# Patient Record
Sex: MALE | Race: WHITE | NOT HISPANIC OR LATINO
[De-identification: names, ages, dates, MRNs, and addresses within clinical notes are randomized per-mention and may not be internally consistent; named-entity substitution may affect disease eponyms.]

---

## 2017-01-26 ENCOUNTER — APPOINTMENT (OUTPATIENT)
Dept: INTERNAL MEDICINE | Facility: CLINIC | Age: 82
End: 2017-01-26

## 2017-01-26 VITALS
DIASTOLIC BLOOD PRESSURE: 60 MMHG | SYSTOLIC BLOOD PRESSURE: 120 MMHG | BODY MASS INDEX: 26.33 KG/M2 | OXYGEN SATURATION: 97 % | WEIGHT: 158 LBS | TEMPERATURE: 97.2 F | HEIGHT: 65 IN | HEART RATE: 86 BPM

## 2017-01-30 ENCOUNTER — APPOINTMENT (OUTPATIENT)
Dept: SURGERY | Facility: CLINIC | Age: 82
End: 2017-01-30

## 2017-01-30 VITALS
DIASTOLIC BLOOD PRESSURE: 69 MMHG | HEART RATE: 80 BPM | SYSTOLIC BLOOD PRESSURE: 131 MMHG | HEIGHT: 63 IN | WEIGHT: 151 LBS | TEMPERATURE: 97.4 F | BODY MASS INDEX: 26.75 KG/M2 | OXYGEN SATURATION: 98 %

## 2017-01-30 DIAGNOSIS — I20.9 ANGINA PECTORIS, UNSPECIFIED: ICD-10-CM

## 2017-01-30 DIAGNOSIS — Z86.39 PERSONAL HISTORY OF OTHER ENDOCRINE, NUTRITIONAL AND METABOLIC DISEASE: ICD-10-CM

## 2017-01-30 DIAGNOSIS — R10.30 LOWER ABDOMINAL PAIN, UNSPECIFIED: ICD-10-CM

## 2017-01-30 DIAGNOSIS — Z81.1 FAMILY HISTORY OF ALCOHOL ABUSE AND DEPENDENCE: ICD-10-CM

## 2017-01-30 DIAGNOSIS — E11.9 TYPE 2 DIABETES MELLITUS W/OUT COMPLICATIONS: ICD-10-CM

## 2017-01-30 DIAGNOSIS — E03.9 HYPOTHYROIDISM, UNSPECIFIED: ICD-10-CM

## 2017-01-30 DIAGNOSIS — D64.9 ANEMIA, UNSPECIFIED: ICD-10-CM

## 2017-01-30 DIAGNOSIS — Z87.898 PERSONAL HISTORY OF OTHER SPECIFIED CONDITIONS: ICD-10-CM

## 2017-01-30 DIAGNOSIS — R35.1 BENIGN PROSTATIC HYPERPLASIA WITH LOWER URINARY TRACT SYMPMS: ICD-10-CM

## 2017-01-30 DIAGNOSIS — N40.1 BENIGN PROSTATIC HYPERPLASIA WITH LOWER URINARY TRACT SYMPMS: ICD-10-CM

## 2017-01-30 DIAGNOSIS — Z78.9 OTHER SPECIFIED HEALTH STATUS: ICD-10-CM

## 2017-01-30 DIAGNOSIS — M10.9 GOUT, UNSPECIFIED: ICD-10-CM

## 2017-01-30 DIAGNOSIS — N39.41 URGE INCONTINENCE: ICD-10-CM

## 2017-01-30 DIAGNOSIS — K21.9 GASTRO-ESOPHAGEAL REFLUX DISEASE W/OUT ESOPHAGITIS: ICD-10-CM

## 2017-01-30 DIAGNOSIS — J30.89 OTHER ALLERGIC RHINITIS: ICD-10-CM

## 2017-01-30 DIAGNOSIS — E78.5 HYPERLIPIDEMIA, UNSPECIFIED: ICD-10-CM

## 2017-01-30 DIAGNOSIS — Z87.39 PERSONAL HISTORY OF OTHER DISEASES OF THE MUSCULOSKELETAL SYSTEM AND CONNECTIVE TISSUE: ICD-10-CM

## 2017-01-30 DIAGNOSIS — M79.89 OTHER SPECIFIED SOFT TISSUE DISORDERS: ICD-10-CM

## 2017-02-01 PROBLEM — Z81.1 FAMILY HISTORY OF ALCOHOLISM: Status: ACTIVE | Noted: 2017-02-01

## 2017-02-01 PROBLEM — R10.30 SEVERE LEFT GROIN PAIN: Status: ACTIVE | Noted: 2017-01-30

## 2017-02-21 ENCOUNTER — INPATIENT (INPATIENT)
Facility: HOSPITAL | Age: 82
LOS: 2 days | Discharge: NURSING, REHAB OR LONG-TERM CARE | DRG: 194 | End: 2017-02-24
Attending: INTERNAL MEDICINE | Admitting: INTERNAL MEDICINE
Payer: MEDICARE

## 2017-02-21 VITALS
HEART RATE: 95 BPM | DIASTOLIC BLOOD PRESSURE: 75 MMHG | SYSTOLIC BLOOD PRESSURE: 152 MMHG | RESPIRATION RATE: 18 BRPM | TEMPERATURE: 101 F | OXYGEN SATURATION: 98 %

## 2017-02-21 DIAGNOSIS — Z29.9 ENCOUNTER FOR PROPHYLACTIC MEASURES, UNSPECIFIED: ICD-10-CM

## 2017-02-21 DIAGNOSIS — I25.10 ATHEROSCLEROTIC HEART DISEASE OF NATIVE CORONARY ARTERY WITHOUT ANGINA PECTORIS: ICD-10-CM

## 2017-02-21 DIAGNOSIS — E11.9 TYPE 2 DIABETES MELLITUS WITHOUT COMPLICATIONS: ICD-10-CM

## 2017-02-21 DIAGNOSIS — M79.89 OTHER SPECIFIED SOFT TISSUE DISORDERS: ICD-10-CM

## 2017-02-21 DIAGNOSIS — Z98.890 OTHER SPECIFIED POSTPROCEDURAL STATES: Chronic | ICD-10-CM

## 2017-02-21 DIAGNOSIS — N40.0 BENIGN PROSTATIC HYPERPLASIA WITHOUT LOWER URINARY TRACT SYMPTOMS: ICD-10-CM

## 2017-02-21 DIAGNOSIS — R53.1 WEAKNESS: ICD-10-CM

## 2017-02-21 DIAGNOSIS — R10.30 LOWER ABDOMINAL PAIN, UNSPECIFIED: ICD-10-CM

## 2017-02-21 DIAGNOSIS — J10.1 INFLUENZA DUE TO OTHER IDENTIFIED INFLUENZA VIRUS WITH OTHER RESPIRATORY MANIFESTATIONS: ICD-10-CM

## 2017-02-21 DIAGNOSIS — H26.40 UNSPECIFIED SECONDARY CATARACT: Chronic | ICD-10-CM

## 2017-02-21 DIAGNOSIS — R21 RASH AND OTHER NONSPECIFIC SKIN ERUPTION: ICD-10-CM

## 2017-02-21 DIAGNOSIS — R63.8 OTHER SYMPTOMS AND SIGNS CONCERNING FOOD AND FLUID INTAKE: ICD-10-CM

## 2017-02-21 DIAGNOSIS — R10.31 RIGHT LOWER QUADRANT PAIN: ICD-10-CM

## 2017-02-21 DIAGNOSIS — Z95.1 PRESENCE OF AORTOCORONARY BYPASS GRAFT: Chronic | ICD-10-CM

## 2017-02-21 DIAGNOSIS — E03.9 HYPOTHYROIDISM, UNSPECIFIED: ICD-10-CM

## 2017-02-21 LAB
ALBUMIN SERPL ELPH-MCNC: 3.9 G/DL — SIGNIFICANT CHANGE UP (ref 3.4–5)
ALP SERPL-CCNC: 67 U/L — SIGNIFICANT CHANGE UP (ref 40–120)
ALT FLD-CCNC: 20 U/L — SIGNIFICANT CHANGE UP (ref 12–42)
ANION GAP SERPL CALC-SCNC: 8 MMOL/L — LOW (ref 9–16)
APPEARANCE UR: CLEAR — SIGNIFICANT CHANGE UP
AST SERPL-CCNC: 16 U/L — SIGNIFICANT CHANGE UP (ref 15–37)
BACTERIA # UR AUTO: SIGNIFICANT CHANGE UP /HPF
BASOPHILS NFR BLD AUTO: 0.2 % — SIGNIFICANT CHANGE UP (ref 0–2)
BILIRUB SERPL-MCNC: 0.5 MG/DL — SIGNIFICANT CHANGE UP (ref 0.2–1.2)
BILIRUB UR-MCNC: NEGATIVE — SIGNIFICANT CHANGE UP
BUN SERPL-MCNC: 16 MG/DL — SIGNIFICANT CHANGE UP (ref 7–23)
CALCIUM SERPL-MCNC: 8.6 MG/DL — SIGNIFICANT CHANGE UP (ref 8.5–10.5)
CHLORIDE SERPL-SCNC: 103 MMOL/L — SIGNIFICANT CHANGE UP (ref 96–108)
CO2 SERPL-SCNC: 28 MMOL/L — SIGNIFICANT CHANGE UP (ref 22–31)
COLOR SPEC: SIGNIFICANT CHANGE UP
CREAT SERPL-MCNC: 0.88 MG/DL — SIGNIFICANT CHANGE UP (ref 0.5–1.3)
DIFF PNL FLD: (no result)
EOSINOPHIL NFR BLD AUTO: 1 % — SIGNIFICANT CHANGE UP (ref 0–6)
EPI CELLS # UR: SIGNIFICANT CHANGE UP /HPF
EXTRA BLUE TOP TUBE: SIGNIFICANT CHANGE UP
EXTRA SST TUBE: SIGNIFICANT CHANGE UP
FLUAV H1 2009 PAND RNA SPEC QL NAA+PROBE: DETECTED
GLUCOSE SERPL-MCNC: 132 MG/DL — HIGH (ref 70–99)
GLUCOSE UR QL: NEGATIVE — SIGNIFICANT CHANGE UP
HBA1C BLD-MCNC: 6.5 % — HIGH (ref 4.8–5.6)
HCT VFR BLD CALC: 37.3 % — LOW (ref 39–50)
HGB BLD-MCNC: 12.5 G/DL — LOW (ref 13–17)
KETONES UR-MCNC: NEGATIVE — SIGNIFICANT CHANGE UP
LACTATE SERPL-SCNC: 1 MMOL/L — SIGNIFICANT CHANGE UP (ref 0.5–2)
LEUKOCYTE ESTERASE UR-ACNC: NEGATIVE — SIGNIFICANT CHANGE UP
LYMPHOCYTES # BLD AUTO: 10 % — LOW (ref 13–44)
MANUAL DIF COMMENT BLD-IMP: SIGNIFICANT CHANGE UP
MANUAL SMEAR VERIFICATION: SIGNIFICANT CHANGE UP
MCHC RBC-ENTMCNC: 30.3 PG — SIGNIFICANT CHANGE UP (ref 27–34)
MCHC RBC-ENTMCNC: 33.5 G/DL — SIGNIFICANT CHANGE UP (ref 32–36)
MCV RBC AUTO: 90.3 FL — SIGNIFICANT CHANGE UP (ref 80–100)
MONOCYTES NFR BLD AUTO: 12 % — SIGNIFICANT CHANGE UP (ref 2–14)
NEUTROPHILS NFR BLD AUTO: 61 % — SIGNIFICANT CHANGE UP (ref 43–77)
NEUTS BAND # BLD: 16 % — HIGH
NITRITE UR-MCNC: NEGATIVE — SIGNIFICANT CHANGE UP
NT-PROBNP SERPL-SCNC: 1834 PG/ML — HIGH
PH UR: 5.5 — SIGNIFICANT CHANGE UP (ref 4–8)
PLAT MORPH BLD: NORMAL — SIGNIFICANT CHANGE UP
PLATELET # BLD AUTO: 130 K/UL — LOW (ref 150–400)
POTASSIUM SERPL-MCNC: 4 MMOL/L — SIGNIFICANT CHANGE UP (ref 3.5–5.3)
POTASSIUM SERPL-SCNC: 4 MMOL/L — SIGNIFICANT CHANGE UP (ref 3.5–5.3)
PROT SERPL-MCNC: 7.4 G/DL — SIGNIFICANT CHANGE UP (ref 6.4–8.2)
PROT UR-MCNC: NEGATIVE MG/DL — SIGNIFICANT CHANGE UP
RAPID RVP RESULT: DETECTED
RBC # BLD: 4.13 M/UL — LOW (ref 4.2–5.8)
RBC # FLD: 13.7 % — SIGNIFICANT CHANGE UP (ref 10.3–16.9)
RBC BLD AUTO: NORMAL — SIGNIFICANT CHANGE UP
RBC CASTS # UR COMP ASSIST: < 5 /HPF — SIGNIFICANT CHANGE UP
SODIUM SERPL-SCNC: 139 MMOL/L — SIGNIFICANT CHANGE UP (ref 135–145)
SP GR SPEC: 1.01 — SIGNIFICANT CHANGE UP (ref 1–1.03)
TSH SERPL-MCNC: 0.34 UIU/ML — LOW (ref 0.35–4.94)
UROBILINOGEN FLD QL: 0.2 E.U./DL — SIGNIFICANT CHANGE UP
WBC # BLD: 5 K/UL — SIGNIFICANT CHANGE UP (ref 3.8–10.5)
WBC # FLD AUTO: 5 K/UL — SIGNIFICANT CHANGE UP (ref 3.8–10.5)
WBC UR QL: < 5 /HPF — SIGNIFICANT CHANGE UP

## 2017-02-21 PROCEDURE — 99223 1ST HOSP IP/OBS HIGH 75: CPT

## 2017-02-21 PROCEDURE — 71020: CPT | Mod: 26

## 2017-02-21 PROCEDURE — 99285 EMERGENCY DEPT VISIT HI MDM: CPT | Mod: 25

## 2017-02-21 PROCEDURE — 74176 CT ABD & PELVIS W/O CONTRAST: CPT | Mod: 26

## 2017-02-21 RX ORDER — OMEPRAZOLE 10 MG/1
1 CAPSULE, DELAYED RELEASE ORAL
Qty: 0 | Refills: 0 | COMMUNITY

## 2017-02-21 RX ORDER — LINAGLIPTIN 5 MG/1
1 TABLET, FILM COATED ORAL
Qty: 0 | Refills: 0 | COMMUNITY

## 2017-02-21 RX ORDER — ALBUTEROL 90 UG/1
1 AEROSOL, METERED ORAL
Qty: 0 | Refills: 0 | Status: DISCONTINUED | OUTPATIENT
Start: 2017-02-21 | End: 2017-02-24

## 2017-02-21 RX ORDER — SODIUM CHLORIDE 9 MG/ML
1000 INJECTION INTRAMUSCULAR; INTRAVENOUS; SUBCUTANEOUS
Qty: 0 | Refills: 0 | Status: DISCONTINUED | OUTPATIENT
Start: 2017-02-21 | End: 2017-02-21

## 2017-02-21 RX ORDER — INSULIN LISPRO 100/ML
VIAL (ML) SUBCUTANEOUS
Qty: 0 | Refills: 0 | Status: DISCONTINUED | OUTPATIENT
Start: 2017-02-21 | End: 2017-02-24

## 2017-02-21 RX ORDER — HEPARIN SODIUM 5000 [USP'U]/ML
5000 INJECTION INTRAVENOUS; SUBCUTANEOUS EVERY 8 HOURS
Qty: 0 | Refills: 0 | Status: DISCONTINUED | OUTPATIENT
Start: 2017-02-21 | End: 2017-02-24

## 2017-02-21 RX ORDER — ASPIRIN/CALCIUM CARB/MAGNESIUM 324 MG
81 TABLET ORAL DAILY
Qty: 0 | Refills: 0 | Status: DISCONTINUED | OUTPATIENT
Start: 2017-02-21 | End: 2017-02-24

## 2017-02-21 RX ORDER — FUROSEMIDE 40 MG
40 TABLET ORAL DAILY
Qty: 0 | Refills: 0 | Status: DISCONTINUED | OUTPATIENT
Start: 2017-02-21 | End: 2017-02-24

## 2017-02-21 RX ORDER — SODIUM CHLORIDE 9 MG/ML
2000 INJECTION INTRAMUSCULAR; INTRAVENOUS; SUBCUTANEOUS ONCE
Qty: 0 | Refills: 0 | Status: COMPLETED | OUTPATIENT
Start: 2017-02-21 | End: 2017-02-21

## 2017-02-21 RX ORDER — BUDESONIDE, MICRONIZED 100 %
1 POWDER (GRAM) MISCELLANEOUS
Qty: 0 | Refills: 0 | COMMUNITY

## 2017-02-21 RX ORDER — ATORVASTATIN CALCIUM 80 MG/1
10 TABLET, FILM COATED ORAL AT BEDTIME
Qty: 0 | Refills: 0 | Status: DISCONTINUED | OUTPATIENT
Start: 2017-02-21 | End: 2017-02-23

## 2017-02-21 RX ORDER — HYDROCORTISONE 1 %
1 OINTMENT (GRAM) TOPICAL
Qty: 0 | Refills: 0 | COMMUNITY

## 2017-02-21 RX ORDER — ATORVASTATIN CALCIUM 80 MG/1
1 TABLET, FILM COATED ORAL
Qty: 0 | Refills: 0 | COMMUNITY

## 2017-02-21 RX ORDER — ACETAMINOPHEN 500 MG
650 TABLET ORAL EVERY 6 HOURS
Qty: 0 | Refills: 0 | Status: DISCONTINUED | OUTPATIENT
Start: 2017-02-21 | End: 2017-02-24

## 2017-02-21 RX ORDER — ACETAMINOPHEN 500 MG
650 TABLET ORAL ONCE
Qty: 0 | Refills: 0 | Status: COMPLETED | OUTPATIENT
Start: 2017-02-21 | End: 2017-02-21

## 2017-02-21 RX ORDER — PANTOPRAZOLE SODIUM 20 MG/1
40 TABLET, DELAYED RELEASE ORAL
Qty: 0 | Refills: 0 | Status: DISCONTINUED | OUTPATIENT
Start: 2017-02-21 | End: 2017-02-24

## 2017-02-21 RX ADMIN — Medication 650 MILLIGRAM(S): at 12:41

## 2017-02-21 RX ADMIN — SODIUM CHLORIDE 6000 MILLILITER(S): 9 INJECTION INTRAMUSCULAR; INTRAVENOUS; SUBCUTANEOUS at 14:12

## 2017-02-21 RX ADMIN — HEPARIN SODIUM 5000 UNIT(S): 5000 INJECTION INTRAVENOUS; SUBCUTANEOUS at 22:21

## 2017-02-21 RX ADMIN — ATORVASTATIN CALCIUM 10 MILLIGRAM(S): 80 TABLET, FILM COATED ORAL at 22:21

## 2017-02-21 RX ADMIN — Medication 75 MILLIGRAM(S): at 16:09

## 2017-02-21 NOTE — ED PROVIDER NOTE - NEUROLOGICAL, MLM
Alert and oriented x 2, slow to respond to questioning, no focal deficits, no motor or sensory deficits.

## 2017-02-21 NOTE — ED PROVIDER NOTE - PROGRESS NOTE DETAILS
improved with IVF,  looks better.  still elderly and weakened by flu.  given sig comorbidities will bring in for cont ivf, tx of flu and monitoring.

## 2017-02-21 NOTE — ED PROVIDER NOTE - CARE PLAN
Principal Discharge DX:	Influenza A H1N1 infection  Secondary Diagnosis:	Dehydration  Secondary Diagnosis:	Confusion

## 2017-02-21 NOTE — H&P ADULT. - PSH
After cataract, bilateral    H/O bilateral inguinal hernia repair    S/P CABG (coronary artery bypass graft)

## 2017-02-21 NOTE — H&P ADULT. - PMH
BPH (benign prostatic hyperplasia)    CAD (coronary artery disease)    Cataracts, bilateral    DM (diabetes mellitus)    Hernia    Hypothyroid    Uncomplicated asthma, unspecified asthma severity

## 2017-02-21 NOTE — ED ADULT NURSE NOTE - CHIEF COMPLAINT QUOTE
Patient c/o BLE weakness since yesterday. Denies focal deficits or confusion. Poor po intake. Reports loose cough as well as RLQ abdominal pain. Noted to have temp of 100.6 on arrival.

## 2017-02-21 NOTE — ED PROVIDER NOTE - OBJECTIVE STATEMENT
83 yo M with hx of DM, CAD s/p 4xCABG 2005 at [facility], hypothyroidism presenting with 2 days of cough, fever, lethargy, generalized weakness and slight confusion as per wife.  Pt also complains of mild RLQ pain x several months.  No n/v/d/c.  Hx of hernia surgery in past being followed by [facility].

## 2017-02-21 NOTE — ED ADULT NURSE NOTE - OBJECTIVE STATEMENT
Pt to ER , brought in by wife for evaluation of bilateral lower extremity weakness and cough x yesterday. Wife states she noted generalized weakness x yesterday, RLQ pain for 1 month, evaluated by PMD.  Pt awake alert, responsive to all stimuli, breathing unlabored, in no acute distress.

## 2017-02-21 NOTE — H&P ADULT. - PROBLEM SELECTOR PLAN 1
Sudden onset, severe, sharp, well-localized, non-radiating RLQ pain for 1 day with h/o multiple inguinal hernia repairs. No leukocytosis, UA normal, CT with diverticulosis, mild bladder wall thickening which could be due to underdistention versus cystitis, no hydronephrosis, no nephrolithiasis, and normal appendix.   -surgery consult Sudden onset, severe, sharp, well-localized, non-radiating right inguinal pain for 1 day with h/o multiple inguinal hernia repairs. Physical exam c/w TTP in the right inguinal area. No appreciated inguinal hernia on exam.   CT a/p showed diverticulosis, mild bladder wall thickening which could be due to underdistention versus cystitis, no hydronephrosis, no nephrolithiasis, and normal appendix.   Right inguinal pain most likely 2/2 muscle strain vs. ligament strain. Pt wife reports that pain began after lifting heavy object in January and has not subsided. Otherwise, unclear etiology at this time.  Low likelihood of diverticulitis or appendicitis given CT a/p report.  No hernia on exam.   - will continue to monitor and offer tylenol PRN for pain

## 2017-02-21 NOTE — H&P ADULT. - PROBLEM SELECTOR PLAN 3
B/l chronic leg swelling with 3+ edema 3/4 lower leg. A/w warm, erythematous, scaly rash, with hemosiderin deposition. Calf tenderness b/l. Venous stasis vs cellulitis vs DVT.  -lasix 40 mg PO daily  -b/l doppler US  -topical triamcinolone 0.1%  -leg elevation

## 2017-02-21 NOTE — ED PROVIDER NOTE - MEDICAL DECISION MAKING DETAILS
Elderly M with s/s noted above.  + influenza A.  Given weak appearance, sig chronic comorbidities will admit for IVF, flu tx and monitoring.

## 2017-02-21 NOTE — H&P ADULT. - PROBLEM SELECTOR PLAN 2
Pt has 1 day of lethargy and weakness. Febrile to 100.6 in ED, fever resolved with acetaminophen. Found to be influenza positive and started on tamifu.   -c/w tamifu  -c/w IVF NS  -acetaminophen PRN for temp >100.4  -isolation Pt has 1 day of lethargy and weakness. Febrile to 100.6 in ED, fever resolved with acetaminophen. Found to be influenza positive and started on tamifu.   -c/w tamifu for 5 days   -c/w IVF NS  -acetaminophen PRN for temp >100.4  -isolation Pt has 1 day of lethargy and weakness. Febrile to 100.6 in ED, fever resolved with acetaminophen. Found to be influenza positive (AH3) and started on tamifu.   -c/w tamifu for 5 days   -c/w IVF NS  -acetaminophen PRN for temp >100.4  -isolation

## 2017-02-21 NOTE — ED PROVIDER NOTE - PMH
CAD (coronary artery disease)    DM (diabetes mellitus)    Hypothyroid    Uncomplicated asthma, unspecified asthma severity

## 2017-02-21 NOTE — ED PROVIDER NOTE - ENMT, MLM
Airway patent, Nasal mucosa clear. Dry MM.  Throat has no vesicles, no oropharyngeal exudates and uvula is midline.

## 2017-02-21 NOTE — H&P ADULT. - PROBLEM SELECTOR PLAN 4
Pt has h/o hypothyroidism, but was told to stop his levothyroxine recently. Weakness may be 2/2 hypthyroidism vs. influenza +  - f/u TSH

## 2017-02-21 NOTE — H&P ADULT. - ATTENDING COMMENTS
pt seen and examined on 2/21/2017  reviewed h&p, vs, labs, CXR, CT a/p report   pt a/f flu; also w/ R inguinal pain x 1 month in setting of heavy lifting    PE  gen: awake, alert, tired appearing, answers questions w/ few words, weak ; sweating slightly    heent : pinpoint pupils b/l; no JVD, MMM   cvs: LUSB murmur  lungs: CTA anterior and laterally, pt unable to sit up or turn to side for posterior auscultation  abd: slightly distended abdomen, soft ; there is pain on palpation of R inguinal hernia repair region; no scrotal tenderness b/l   legs: b/l lower ext +2 pitting edema w/ erythema, venous stasis changes b/l ; there is lower ext tenderness noted as well b/l   a/p:   1. flu: on tamiflu; s/p IVFs in ED, appeared euvolemic at time of exam ; monitor fluid status, hold off on IVFs for now  2. R inguinal pain: concern for recurring hernia, given lifting episode noted in HPI; consult surgery for further evaluation  3. lower ext edema/ venous stasis: restarted on lasix; dopplers pending; obtain collateral from pt's cardiologisy (Dr. Hendrickson) re: last ECHO  4. BPH: restart finasteride and oxybutinin as tolerated by bp pt seen and examined on 2/21/2017  reviewed h&p, vs, labs, CXR, CT a/p report   pt a/f flu; also w/ R inguinal pain x 1 month in setting of heavy lifting    PE  gen: awake, alert, tired appearing, answers questions w/ few words, weak ; sweating slightly    heent : pinpoint pupils b/l; no JVD, MMM   cvs: LUSB murmur  lungs: CTA anterior and laterally, pt unable to sit up or turn to side for posterior auscultation  abd/gu: slightly distended abdomen, soft ; there is pain on palpation of R inguinal hernia repair region; +wearing [place] catheter; no scrotal tenderness b/l   legs: b/l lower ext +2 pitting edema w/ erythema, venous stasis changes b/l ; there is lower ext tenderness noted as well b/l   a/p:   1. flu: on tamiflu; s/p IVFs in ED, appeared euvolemic at time of exam ; monitor fluid status, hold off on IVFs for now  2. R inguinal pain: concern for recurring hernia, given lifting episode noted in HPI; consult surgery for further evaluation  3. lower ext edema/ venous stasis: restarted on lasix; dopplers pending; obtain collateral from pt's cardiologisy (Dr. Hendrickson) re: last ECHO  4. BPH: restart finasteride and oxybutinin as tolerated by bp pt seen and examined on 2/21/2017  reviewed h&p, vs, labs, CXR, CT a/p report   pt a/f flu; also w/ R inguinal pain x 1 month in setting of heavy lifting    PE  gen: awake, alert, tired appearing, answers questions w/ few words, weak ; sweating slightly    heent : pinpoint pupils b/l; no JVD, MMM   cvs: LUSB murmur  lungs: CTA anterior and laterally, pt unable to sit up or turn to side for posterior auscultation  abd/gu: slightly distended abdomen, soft ; there is pain on palpation of R inguinal hernia repair region; +wearing [place] catheter; no scrotal tenderness b/l   legs: b/l lower ext +2 pitting edema w/ erythema, venous stasis changes b/l ; there is lower ext tenderness noted as well b/l   a/p:   1. flu: on tamiflu; s/p IVFs in ED, appeared euvolemic at time of exam ; monitor fluid status, hold off on IVFs for now  2. R inguinal pain: concern for recurring hernia, given lifting episode noted in HPI; consult surgery for further evaluation  3. lower ext edema/ venous stasis: restarted on lasix; dopplers pending; obtain collateral from pt's cardiologist (Dr. Hendrickson) re: last ECHO  4. BPH: restart finasteride and oxybutinin as tolerated by bp

## 2017-02-21 NOTE — H&P ADULT. - ASSESSMENT
83 yo M with PMH of CAD s/p CABG,  multiple hernias x4, DM, hypothyroidism, cataracts, BPH, chronic leg swelling presents with one day of lethargy and weakness due to severe, sharp RLQ pain. Pt febrile in ED and influenza positive, CT with chronic diverticulosis. 81 yo M with PMH of CAD s/p CABG,  multiple hernias x4, DM, hypothyroidism, cataracts, BPH, chronic leg swelling presents with one day of lethargy and weakness due to severe, sharp RLQ pain. Pt febrile in ED and influenza positive, labs wnl, CT with chronic diverticulosis. 83 yo M with PMH of CAD s/p CABG,  multiple hernias x4, DM, hypothyroidism, cataracts, BPH, chronic leg swelling presents with one day of lethargy and weakness due to severe, sharp right inguinal pain. Pt febrile in ED and influenza positive, labs wnl, CT with chronic diverticulosis.

## 2017-02-21 NOTE — H&P ADULT. - PROBLEM SELECTOR PLAN 7
Pt has long standing history of BPH, was on finasteride and oxybutynin, but were stopped as pt states did not help him.   - gonzalez in place for retention   - will dc tomorrow for [facility] Pt has long standing history of BPH, was on finasteride and oxybutynin, but were stopped as pt states did not help him.

## 2017-02-21 NOTE — H&P ADULT. - HISTORY OF PRESENT ILLNESS
84 yo M with PMH of CAD s/p CABG, DM, hypothyroidism, multiple hernias x4, cataracts and BPH presents with one day of weakness and lethargy. Pt says weakness started last night when he was unable to get up from his chair due to RLQ pain, well localized over the right inguinal ligament. Pain does not radiate and is worse with movement and feels better when he pushes on the area with his hand, not worse with cough. He describes feeling lethargic. Denies having fevers, chills, dysuria, CP, and SOB. Pt also has chronic leg swelling that comes and goes. Was previously given lasix by his cardiologist, but did not notice any improvement in leg swelling so discontinued use.     In ED vitals T100.6, HR 95, /75, RR 18, O2 98% on RA. Found to be influenza positive. Given oseltamivir,  2L NS bolus, 1L NS at 150 ml/hour, acetaminophen tablet  mg. 82 yo M with PMH of CAD s/p CABG, DM, hypothyroidism, multiple hernias x4, cataracts and BPH presents with one day of weakness and lethargy. Pt says weakness started last night when he was unable to get up from his chair due to RLQ pain, well localized over the right inguinal ligament. Pain does not radiate and is worse with movement and feels better when he pushes on the area with his hand, not worse with cough. Per wife, pain started after lifting a heavy object in January and has not improved since then. He describes feeling lethargic. Denies having fevers, chills, dysuria, CP, and SOB. No change in urinary frequency or bowel habits. Pt also has chronic leg swelling that comes and goes. Was previously given lasix by his cardiologist, but did not notice any improvement in leg swelling so discontinued use.     In ED vitals T100.6, HR 95, /75, RR 18, O2 98% on RA. Found to be influenza positive. Given oseltamivir,  2L NS bolus, 1L NS at 150 ml/hour, acetaminophen tablet  mg.

## 2017-02-21 NOTE — H&P ADULT. - GASTROINTESTINAL DETAILS
no rebound tenderness/normal/no distention/no organomegaly/bowel sounds normal/soft/no rigidity/no masses palpable/no guarding

## 2017-02-22 DIAGNOSIS — K59.00 CONSTIPATION, UNSPECIFIED: ICD-10-CM

## 2017-02-22 DIAGNOSIS — J11.1 INFLUENZA DUE TO UNIDENTIFIED INFLUENZA VIRUS WITH OTHER RESPIRATORY MANIFESTATIONS: ICD-10-CM

## 2017-02-22 DIAGNOSIS — E03.9 HYPOTHYROIDISM, UNSPECIFIED: ICD-10-CM

## 2017-02-22 LAB
ANION GAP SERPL CALC-SCNC: 8 MMOL/L — LOW (ref 9–16)
BUN SERPL-MCNC: 16 MG/DL — SIGNIFICANT CHANGE UP (ref 7–23)
CALCIUM SERPL-MCNC: 8.3 MG/DL — LOW (ref 8.5–10.5)
CHLORIDE SERPL-SCNC: 107 MMOL/L — SIGNIFICANT CHANGE UP (ref 96–108)
CO2 SERPL-SCNC: 25 MMOL/L — SIGNIFICANT CHANGE UP (ref 22–31)
CREAT SERPL-MCNC: 0.9 MG/DL — SIGNIFICANT CHANGE UP (ref 0.5–1.3)
CULTURE RESULTS: NO GROWTH — SIGNIFICANT CHANGE UP
GLUCOSE SERPL-MCNC: 120 MG/DL — HIGH (ref 70–99)
HCT VFR BLD CALC: 33.7 % — LOW (ref 39–50)
HGB BLD-MCNC: 11.1 G/DL — LOW (ref 13–17)
MAGNESIUM SERPL-MCNC: 1.9 MG/DL — SIGNIFICANT CHANGE UP (ref 1.6–2.4)
MCHC RBC-ENTMCNC: 30.4 PG — SIGNIFICANT CHANGE UP (ref 27–34)
MCHC RBC-ENTMCNC: 32.9 G/DL — SIGNIFICANT CHANGE UP (ref 32–36)
MCV RBC AUTO: 92.3 FL — SIGNIFICANT CHANGE UP (ref 80–100)
PLATELET # BLD AUTO: 102 K/UL — LOW (ref 150–400)
POTASSIUM SERPL-MCNC: 4 MMOL/L — SIGNIFICANT CHANGE UP (ref 3.5–5.3)
POTASSIUM SERPL-SCNC: 4 MMOL/L — SIGNIFICANT CHANGE UP (ref 3.5–5.3)
RBC # BLD: 3.65 M/UL — LOW (ref 4.2–5.8)
RBC # FLD: 14.1 % — SIGNIFICANT CHANGE UP (ref 10.3–16.9)
SODIUM SERPL-SCNC: 140 MMOL/L — SIGNIFICANT CHANGE UP (ref 135–145)
SPECIMEN SOURCE: SIGNIFICANT CHANGE UP
WBC # BLD: 4.2 K/UL — SIGNIFICANT CHANGE UP (ref 3.8–10.5)
WBC # FLD AUTO: 4.2 K/UL — SIGNIFICANT CHANGE UP (ref 3.8–10.5)

## 2017-02-22 PROCEDURE — 93970 EXTREMITY STUDY: CPT | Mod: 26

## 2017-02-22 PROCEDURE — 99232 SBSQ HOSP IP/OBS MODERATE 35: CPT

## 2017-02-22 RX ORDER — DOCUSATE SODIUM 100 MG
100 CAPSULE ORAL DAILY
Qty: 0 | Refills: 0 | Status: DISCONTINUED | OUTPATIENT
Start: 2017-02-22 | End: 2017-02-24

## 2017-02-22 RX ORDER — SENNA PLUS 8.6 MG/1
1 TABLET ORAL DAILY
Qty: 0 | Refills: 0 | Status: DISCONTINUED | OUTPATIENT
Start: 2017-02-22 | End: 2017-02-24

## 2017-02-22 RX ORDER — POLYETHYLENE GLYCOL 3350 17 G/17G
17 POWDER, FOR SOLUTION ORAL DAILY
Qty: 0 | Refills: 0 | Status: DISCONTINUED | OUTPATIENT
Start: 2017-02-22 | End: 2017-02-24

## 2017-02-22 RX ORDER — ACETAMINOPHEN 500 MG
650 TABLET ORAL EVERY 6 HOURS
Qty: 0 | Refills: 0 | Status: DISCONTINUED | OUTPATIENT
Start: 2017-02-22 | End: 2017-02-24

## 2017-02-22 RX ORDER — TAMSULOSIN HYDROCHLORIDE 0.4 MG/1
0.4 CAPSULE ORAL AT BEDTIME
Qty: 0 | Refills: 0 | Status: DISCONTINUED | OUTPATIENT
Start: 2017-02-22 | End: 2017-02-24

## 2017-02-22 RX ADMIN — Medication 2: at 13:17

## 2017-02-22 RX ADMIN — Medication 40 MILLIGRAM(S): at 06:57

## 2017-02-22 RX ADMIN — HEPARIN SODIUM 5000 UNIT(S): 5000 INJECTION INTRAVENOUS; SUBCUTANEOUS at 06:57

## 2017-02-22 RX ADMIN — HEPARIN SODIUM 5000 UNIT(S): 5000 INJECTION INTRAVENOUS; SUBCUTANEOUS at 13:27

## 2017-02-22 RX ADMIN — Medication 75 MILLIGRAM(S): at 17:25

## 2017-02-22 RX ADMIN — Medication 100 MILLIGRAM(S): at 13:19

## 2017-02-22 RX ADMIN — Medication 81 MILLIGRAM(S): at 13:23

## 2017-02-22 RX ADMIN — POLYETHYLENE GLYCOL 3350 17 GRAM(S): 17 POWDER, FOR SOLUTION ORAL at 13:18

## 2017-02-22 RX ADMIN — PANTOPRAZOLE SODIUM 40 MILLIGRAM(S): 20 TABLET, DELAYED RELEASE ORAL at 06:58

## 2017-02-22 RX ADMIN — ATORVASTATIN CALCIUM 10 MILLIGRAM(S): 80 TABLET, FILM COATED ORAL at 21:32

## 2017-02-22 RX ADMIN — HEPARIN SODIUM 5000 UNIT(S): 5000 INJECTION INTRAVENOUS; SUBCUTANEOUS at 21:32

## 2017-02-22 RX ADMIN — SENNA PLUS 1 TABLET(S): 8.6 TABLET ORAL at 13:19

## 2017-02-22 RX ADMIN — Medication 75 MILLIGRAM(S): at 07:12

## 2017-02-22 RX ADMIN — TAMSULOSIN HYDROCHLORIDE 0.4 MILLIGRAM(S): 0.4 CAPSULE ORAL at 21:32

## 2017-02-22 NOTE — PHYSICAL THERAPY INITIAL EVALUATION ADULT - ADDITIONAL COMMENTS
Patient states he occasionally required assist from wife for bathing/dressing. Patient denies history of mechanical falls within the past 6 months however states, "I honestly can't remember."

## 2017-02-22 NOTE — PROGRESS NOTE ADULT - ASSESSMENT
84 yo M with PMH of CAD s/p CABG, DM, hypothyroidism, multiple hernias x4, cataracts and BPH presents with one day of weakness and lethargy. Pt says weakness started last night when he was unable to get up from his chair due to RLQ pain, well localized over the right inguinal ligament. Pain does not radiate and is worse with movement and feels better when he pushes on the area with his hand, not worse with cough. Per wife, pain started after lifting a heavy object in January and has not improved since then. He also has both leg swelling with erythema. Found to have A1H1, was given oseltamivir. He was given tylenol PRN RLQ pain and will receive consultation from . He was also give furosemide for his leg swelling and will receive Doppler US today. 84 yo M with PMH of CAD s/p CABG, DM, hypothyroidism, multiple hernias x4, cataracts and BPH presents with weakness and right inguinal pain, found to be positive for influenza, now with improvement in right inguinal pain.

## 2017-02-22 NOTE — PHYSICAL THERAPY INITIAL EVALUATION ADULT - IMPAIRMENTS CONTRIBUTING IMPAIRED BED MOBILITY, REHAB EVAL
decreased flexibility/decreased strength/impaired balance/impaired postural control/impaired coordination

## 2017-02-22 NOTE — PHYSICAL THERAPY INITIAL EVALUATION ADULT - DIAGNOSIS, PT EVAL
82 yo M with PMH of CAD s/p CABG, DM, hypothyroidism, multiple hernias x4, cataracts and BPH presents with one day of weakness and lethargy. Pt says weakness started last night when he was unable to get up from his chair due to RLQ pain, well localized over the right inguinal ligament. Pain does not radiate and is worse with movement and feels better when he pushes on the area with his hand, not worse with cough. Please refer to H&P on [place] for remaining.

## 2017-02-22 NOTE — PHYSICAL THERAPY INITIAL EVALUATION ADULT - IMPAIRMENTS CONTRIBUTING TO GAIT DEVIATIONS, PT EVAL
impaired coordination/impaired postural control/decreased flexibility/impaired balance/decreased strength

## 2017-02-22 NOTE — PROGRESS NOTE ADULT - PROBLEM SELECTOR PLAN 3
Pt is s/p CABG in 2005. No current chest pain  - c/w asa 81mg qday  - c/w lipitor 10mg qday Weakness most likely 2/2 overall deconditioning and worsened by influenza  - pt states that over the last 2-3 months, he has not ambulated or done much physical activity 2/2 right inguinal pain

## 2017-02-22 NOTE — PROGRESS NOTE ADULT - SUBJECTIVE AND OBJECTIVE BOX
OVERNIGHT EVENTS: No overnight events. Pt has continued sharp RLQ pain that has become moderate (NRS 10 --> 9,8)  Has not had bm overnight. He still has cough and feels lethargic and weak, but has apatite.     VITAL SIGNS:   Vital Signs Last 24 Hrs  T(F): 98.4 Max: 100.2 (02-21 @ 22:13)  HR: 96 (94 - 96)  BP: 147/71 (140/61 - 147/71)  RR: 19 (18 - 24)  SpO2: 100% (96% - 100%)  Wt(kg): 68.6    CAPILLARY BLOOD GLUCOSE:  120 mg/dl    PHYSICAL EXAM:  Constitutional: Pt is in clear discomfort/pain but in NAD  HEENT: NCAT, PEERL, sclera non-icteric, no conjunctival pallor  Neck: supple, no JVD  Respiratory: CTA b/l, no wheezes, No rales, No rhonchi  Cardiovascular: RRR, systolic murmur on LUSB  Gastrointestinal: soft, tender but less since admission +BS, no guarding  Extremities: WWP, DP/radial pulses 2+ b/l, no edema  Neurological: AAOx 3, responds to commands, moves all extremities, CN 2-12 intact  Musculoskeletal: 5/5 strength throughout    MEDICATIONS  (STANDING):  aspirin Oral daily   heparin Subcutaneous   ciprofloxacin   IVPB 400milliGRAM(s) IV Intermittent every 12 hours  heparin  Injectable 5000Unit(s) SubCutaneous every 8 hours  ciprofloxacin   IVPB  IV Intermittent   metroNIDAZOLE  IVPB  IV Intermittent   metroNIDAZOLE  IVPB 500milliGRAM(s) IV Intermittent every 8 hours    MEDICATIONS  (PRN):  ondansetron Injectable 4milliGRAM(s) IV Push every 6 hours PRN Nausea  acetaminophen   Tablet 650milliGRAM(s) Oral every 6 hours PRN For Temp greater than 38 C (100.4 F)  melatonin 3milliGRAM(s) Oral at bedtime PRN Insomnia      ALLERGIES: No Known Allergies      INTOLERANCES:   LABS:                        10.3   7.8   )-----------( 161      ( 21 Feb 2017 07:35 )             30.7     21 Feb 2017 07:35    138    |  106    |  9      ----------------------------<  148    3.9     |  24     |  0.93     Ca    8.0        21 Feb 2017 07:35  Phos  1.1       21 Feb 2017 07:35  Mg     2.1       21 Feb 2017 07:35    TPro  6.2    /  Alb  2.5    /  TBili  0.4    /  DBili  x      /  AST  35     /  ALT  45     /  AlkPhos  91     21 Feb 2017 07:35    PT/INR - ( 20 Feb 2017 17:48 )   PT: 14.0 sec;   INR: 1.26          PTT - ( 20 Feb 2017 17:48 )  PTT:29.8 sec  Urinalysis Basic - ( 20 Feb 2017 22:29 )    Color: Yellow / Appearance: Clear / SG: <=1.005 / pH: x  Gluc: x / Ketone: NEGATIVE  / Bili: NEGATIVE / Urobili: 0.2 E.U./dL   Blood: x / Protein: NEGATIVE mg/dL / Nitrite: NEGATIVE   Leuk Esterase: NEGATIVE / RBC: < 5 /HPF / WBC 5-10 /HPF   Sq Epi: x / Non Sq Epi: Few /HPF / Bacteria: Present /HPF      RADIOLOGY & ADDITIONAL TESTS: OVERNIGHT EVENTS: No overnight events. Pt has continued sharp RLQ pain that has become moderate (NRS 10 --> 9,8)  Has not had bm overnight. He still has cough and feels lethargic and weak, but has apatite.     VITAL SIGNS:   Vital Signs Last 24 Hrs  T(F): 98.4 Max: 100.2 (02-21 @ 22:13)  HR: 96 (94 - 96)  BP: 147/71 (140/61 - 147/71)  RR: 19 (18 - 24)  SpO2: 100% (96% - 100%)  Wt(kg): 68.6    CAPILLARY BLOOD GLUCOSE:  120 mg/dl    PHYSICAL EXAM:  Constitutional: Pt is in clear discomfort/pain but in NAD  HEENT: NCAT, PEERL, sclera non-icteric, no conjunctival pallor  Neck: supple, no JVD  Respiratory: CTA b/l, no wheezes, No rales, No rhonchi  Cardiovascular: RRR, systolic murmur on LUSB  Gastrointestinal: soft, tender but less since admission +BS, no guarding  Extremities: WWP, DP/radial pulses 2+ b/l, no edema  Neurological: AAOx 3, responds to commands, moves all extremities, CN 2-12 intact  Musculoskeletal: 5/5 strength throughout    MEDICATIONS  (STANDING):  heparin Subcutaneous 5000 Units every 8 hours   furosemide 40 miliGRAM(s) Oral daily  oseltamivir 75 miliGRAM(s) Oral two times a day, stop after 5 days   aspirin 81 milliGRAM(s) Oral daily   atorvastatin 10 milliGRAM(s) Oral at bedtime   insulin lispro corrective regimen sliding scale, subcutaneous, before meals and at bedtime   pantoprazole 40 milliGRAM(s) Oral before breakfast   docusate sodium 100 milliGRAM(s) Oral daily   polyethylene glycol 17 Gram(s) Oral daily   senna 1 tablet Oral daily     MEDICATIONS  (PRN):  acetaminophen 650 milliGRAM(s) Oral PRN temp greater than 100.4  albuterol 90 microGRAM(s) inhalation four times a day PRN for respiratory distress       ALLERGIES: No Known Allergies      INTOLERANCES: No Known Intolerances   LABS:                        11.1   4.2   )-----------( 102     ( 2017 07:35 )            33.7     2017 06:46    140   |  107    |   16    ----------------------------< 120   4.0     |  25     |  0.90     Ca    8.3        2017 06:46  Mg   1.9         2017 06:46    TSH 0.343      2017 12:20  HbA1c 6.5      2017 12:20  BNP 1834      2017 12:20    Urinalysis Basic - ( 2017 22:29 )    Color: Lorin / Appearance: Clear / S.015/ pH: 5.5  Gluc: x / Ketone: NEGATIVE  / Bili: NEGATIVE / Urobili: 0.2 E.U./dL   Blood: Trace / Protein: NEGATIVE mg/dL / Nitrite: NEGATIVE   Leuk Esterase: NEGATIVE / RBC: < 5 /HPF / WBC < 5 /HPF   Sq Epi: x / Non Sq Epi: Rare /HPF / Bacteria: None Seen      RADIOLOGY & ADDITIONAL TESTS:  Colonic diverticulosis noted without evidence of diverticulitis. A normal appendix measuring up to 0.7 cm without   surrounding periappendiceal inflammatory changes is seen. No ascites is evident. Small fat-containing umbilical hernia. Fat-containing subxiphoid hernia is seen.    Images of the pelvis demonstrate evidence of prior right hernia repair. Enlarged prostate seen abutting into the bladder. There is mild bladder wall thickening which could be due to underdistention versus cystitis. OVERNIGHT EVENTS/ INTERVAL HPI: No overnight events. Pt has continued sharp right inguinal pain that has become moderate (pain scale: 10 --> 9,8)  Has not had bm overnight. He still has cough and feels lethargic and weak, but has appetite        Vital Signs Last 24 Hrs  T(C): 36.7, Max: 37.9 ( @ 22:13)  T(F): 98.1, Max: 100.2 ( @ 22:13)  HR: 89 (72 - 96)  BP: 121/57 (105/52 - 151/80)  BP(mean): --  RR: 20 (18 - 24)  SpO2: 97% (96% - 100%)      PHYSICAL EXAM:  Constitutional: Pt comfortable and in NAD  HEENT: NCAT, PEERL, sclera non-icteric, no conjunctival pallor  Neck: supple, no JVD  Respiratory: CTA b/l, no wheezes, No rales, No rhonchi  Cardiovascular: RRR, 3/6 systolic murmur on LUSB  Gastrointestinal: soft, non-tender in all four quadrants, +BS, no guarding. TTP in right inguinal area.   Extremities: WWP, DP/radial pulses 2+ b/l, 2+ pitting edema from feet to mid-calf bilaterally.   Neurological: AAOx 3, responds to commands, moves all extremities, CN 2-12 intact  Skin: mildly erythematous dry skin on b/l LE     MEDICATIONS  (STANDING):  heparin  Injectable 5000Unit(s) SubCutaneous every 8 hours  insulin lispro (HumaLOG) corrective regimen sliding scale  SubCutaneous Before meals and at bedtime  pantoprazole    Tablet 40milliGRAM(s) Oral before breakfast  aspirin enteric coated 81milliGRAM(s) Oral daily  atorvastatin 10milliGRAM(s) Oral at bedtime  oseltamivir 75milliGRAM(s) Oral two times a day  furosemide    Tablet 40milliGRAM(s) Oral daily  tamsulosin 0.4milliGRAM(s) Oral at bedtime  polyethylene glycol 3350 17Gram(s) Oral daily  docusate sodium 100milliGRAM(s) Oral daily  senna 1Tablet(s) Oral daily    MEDICATIONS  (PRN):  acetaminophen   Tablet 650milliGRAM(s) Oral every 6 hours PRN For Temp greater than 38 C (100.4 F)  ALBUTerol    90 MICROgram(s) HFA Inhaler 1Puff(s) Inhalation four times a day PRN Respiratory Distress      ALLERGIES: No Known Allergies  INTOLERANCES: No Known Intolerances       LABS:                        11.1   4.2   )-----------( 102      ( 2017 06:46 )             33.7     2017 06:46    140    |  107    |  16     ----------------------------<  120    4.0     |  25     |  0.90     Ca    8.3        2017 06:46  Mg     1.9       2017 06:46    TPro  7.4    /  Alb  3.9    /  TBili  0.5    /  DBili  x      /  AST  16     /  ALT  20     /  AlkPhos  67     2017 12:20      Urinalysis Basic - ( 2017 22:29 )    Color: Lorin / Appearance: Clear / S.015/ pH: 5.5  Gluc: x / Ketone: NEGATIVE  / Bili: NEGATIVE / Urobili: 0.2 E.U./dL   Blood: Trace / Protein: NEGATIVE mg/dL / Nitrite: NEGATIVE   Leuk Esterase: NEGATIVE / RBC: < 5 /HPF / WBC < 5 /HPF   Sq Epi: x / Non Sq Epi: Rare /HPF / Bacteria: None Seen      RADIOLOGY & ADDITIONAL TESTS:  CT A/p non-con (17)   IMPRESSION:  1.  No hydronephrosis or nephrolithiasis.  2.  Normal appendix.  3.  Colonic diverticulosis without evidence of diverticulitis.  4.  Cholelithiasis.    US DPLX LWR EXT VEINS COMPL BI  2017    IIMPRESSION:  No deep vein thrombosis seen

## 2017-02-22 NOTE — PHYSICAL THERAPY INITIAL EVALUATION ADULT - LEVEL OF INDEPENDENCE: SIT/STAND, REHAB EVAL
moderate assist (50% patients effort)/maximum assist (25% patients effort)/Slightly unsteady, mod assist to achieve complete trunk/hip extension

## 2017-02-22 NOTE — CONSULT NOTE ADULT - SUBJECTIVE AND OBJECTIVE BOX
Patient is a 82y old  Male who presents with a chief complaint of weakness/lethargy (2017 17:56)      HPI:  84 yo M with PMH of CAD s/p CABG, DM, hypothyroidism, multiple hernias x4, cataracts and BPH presents with one day of weakness and lethargy. Pt says weakness started last night when he was unable to get up from his chair due to RLQ pain, well localized over the right inguinal ligament. Pain does not radiate and is worse with movement and feels better when he pushes on the area with his hand, not worse with cough. Per wife, pain started after lifting a heavy object in January and has not improved since then. He describes feeling lethargic. Denies having fevers, chills, dysuria, CP, and SOB. No change in urinary frequency or bowel habits. Pt also has chronic leg swelling that comes and goes. Was previously given lasix by his cardiologist, but did not notice any improvement in leg swelling so discontinued use.     In ED vitals T100.6, HR 95, /75, RR 18, O2 98% on RA. Found to be influenza positive. Given oseltamivir,  2L NS bolus, 1L NS at 150 ml/hour, acetaminophen tablet  mg. (2017 17:56)      PAST MEDICAL & SURGICAL HISTORY:  Hernia  Cataracts, bilateral  BPH (benign prostatic hyperplasia)  Uncomplicated asthma, unspecified asthma severity  Hypothyroid  CAD (coronary artery disease)  DM (diabetes mellitus)  After cataract, bilateral  S/P CABG (coronary artery bypass graft)  H/O bilateral inguinal hernia repair      MEDICATIONS  (STANDING):  heparin  Injectable 5000Unit(s) SubCutaneous every 8 hours  insulin lispro (HumaLOG) corrective regimen sliding scale  SubCutaneous Before meals and at bedtime  pantoprazole    Tablet 40milliGRAM(s) Oral before breakfast  aspirin enteric coated 81milliGRAM(s) Oral daily  atorvastatin 10milliGRAM(s) Oral at bedtime  oseltamivir 75milliGRAM(s) Oral two times a day  furosemide    Tablet 40milliGRAM(s) Oral daily  tamsulosin 0.4milliGRAM(s) Oral at bedtime  polyethylene glycol 3350 17Gram(s) Oral daily  docusate sodium 100milliGRAM(s) Oral daily  senna 1Tablet(s) Oral daily    MEDICATIONS  (PRN):  acetaminophen   Tablet 650milliGRAM(s) Oral every 6 hours PRN For Temp greater than 38 C (100.4 F)  ALBUTerol    90 MICROgram(s) HFA Inhaler 1Puff(s) Inhalation four times a day PRN Respiratory Distress      Social History: lives with spouse in an elevator accessible apartment building, no home care services    Functional Level Prior to Admission: reports wife assist with bathing as needed, walks with a cane/rollator    FAMILY HISTORY:  No pertinent family history in first degree relatives      CBC Full  -  ( 2017 06:46 )  WBC Count : 4.2 K/uL  Hemoglobin : 11.1 g/dL  Hematocrit : 33.7 %  Platelet Count - Automated : 102 K/uL  Mean Cell Volume : 92.3 fL  Mean Cell Hemoglobin : 30.4 pg  Mean Cell Hemoglobin Concentration : 32.9 g/dL  Auto Neutrophil # : x  Auto Lymphocyte # : x  Auto Monocyte # : x  Auto Eosinophil # : x  Auto Basophil # : x  Auto Neutrophil % : x  Auto Lymphocyte % : x  Auto Monocyte % : x  Auto Eosinophil % : x  Auto Basophil % : x      2017 06:46    140    |  107    |  16     ----------------------------<  120    4.0     |  25     |  0.90     Ca    8.3        2017 06:46  Mg     1.9       2017 06:46    TPro  7.4    /  Alb  3.9    /  TBili  0.5    /  DBili  x      /  AST  16     /  ALT  20     /  AlkPhos  67     2017 12:20      Urinalysis Basic - ( 2017 15:38 )    Color: Lorin / Appearance: Clear / S.015 / pH: x  Gluc: x / Ketone: NEGATIVE  / Bili: NEGATIVE / Urobili: 0.2 E.U./dL   Blood: x / Protein: NEGATIVE mg/dL / Nitrite: NEGATIVE   Leuk Esterase: NEGATIVE / RBC: < 5 /HPF / WBC < 5 /HPF   Sq Epi: x / Non Sq Epi: Rare /HPF / Bacteria: None Seen /HPF      Radiology:    EXAM:  XR CHEST PA - LAT                           PROCEDURE DATE:  2017                 INTERPRETATION:    Chest x-ray     Indication: Cough    AP and Lateral images of the chest are submitted.  No prior chest x-ray   is available for comparison.  Study is limited due to patient's rotation.   The patient is status post median sternotomy. Heart size is within normal   limits.  No mediastinal widening is seen.  No pulmonary consolidation is   seen.  No pleural effusion or pneumothorax is identified. Degenerative   changes of the spine are seen    IMPRESSION: No pulmonary infiltrates.    EXAM:  US DPLX LWR EXT VEINS COMPL BI                           PROCEDURE DATE:  2017                 INTERPRETATION:    VENOUS DUPLEX DOPPLER OF BOTH LOWER EXTREMITIES dated 2017 12:23 PM    INDICATION: Groin pain.    TECHNIQUE: Duplex Doppler evaluation including gray-scale ultrasound   imaging, color flow Doppler imaging, and Doppler spectral analysis of the   veins of both lower extremities was performed.     COMPARISON: 2015.    FINDINGS:    Thigh veins: The common femoral,femoral, popliteal, proximal greater   saphenous, and proximal deep femoral veins are patent and free of   thrombus bilaterally. The veins are normally compressible and have normal   phasic flow and augmentation response.    Calf veins: The paired peroneal and posterior tibial calf veins are   patent bilaterally.      IMPRESSION:  No deep vein thrombosis seen.      Vital Signs Last 24 Hrs  T(C): 36.7, Max: 37.9 ( @ 22:13)  T(F): 98.1, Max: 100.2 ( @ 22:13)  HR: 89 (72 - 96)  BP: 121/57 (105/52 - 151/80)  BP(mean): --  RR: 20 (18 - 24)  SpO2: 97% (96% - 100%)    REVIEW OF SYSTEMS:    CONSTITUTIONAL: fatigue  EYES: No eye pain, visual disturbances, or discharge  ENMT:  No difficulty hearing, tinnitus, vertigo; No sinus or throat pain  NECK: No pain or stiffness  BREASTS: No pain, masses, or nipple discharge  RESPIRATORY: No cough, wheezing, chills or hemoptysis; No shortness of breath  CARDIOVASCULAR: No chest pain, palpitations, dizziness, or leg swelling  GASTROINTESTINAL: No abdominal or epigastric pain. No nausea, vomiting, or hematemesis; No diarrhea or constipation. No melena or hematochezia.  GENITOURINARY: No dysuria, frequency, hematuria, or incontinence  NEUROLOGICAL: No headaches, memory loss, loss of strength, numbness, or tremors  SKIN: No itching, burning, rashes, or lesions   LYMPH NODES: No enlarged glands  ENDOCRINE: No heat or cold intolerance; No hair loss  MUSCULOSKELETAL: right groin pain since 2017 ( reports heavy lifting)  PSYCHIATRIC: No depression, anxiety, mood swings, or difficulty sleeping  HEME/LYMPH: No easy bruising, or bleeding gums  ALLERGY AND IMMUNOLOGIC: No hives or eczema      Physical Exam: on isolation, well developed, well nourished  male lying in bed, c/o right groin pain    HEENT: normocephalic/ atraumatic, anicteric    Neck: supple, negative JVD, negative carotid bruits,    Chest: CTA bilaterally, neg wheeze, rhonchi, rales, crackles, egophany    Cardiovascular: regular rate and rhythm, III/VI ÁLVARO    Abdomen: soft, non tender, negative rebound/guarding, mildly distended, normal bowel sounds, neg hepatosplenomegaly    Extremities: WWP, 2+ LE edema with chronic venous stasis changes, negative calf tenderness to palpation, neg James's sign, right groin TTP/ no palpable hernia/ pain with hip flexion    Neurologic Exam:    Alert and oriented to person, place, ? month/ 2017, speech fluent w/o dysarthria, repetition intact, comprehension intact,     Cranial Nerves:     II:                       pupils equal, round and reactive to light, visual fields intact   III/ IV/VI:             extraocular movements intact, neg nystagmus, ptosis  V:                      facial sensation intact, V1-3 normal  VII:                     face symmetric, no droop, normal eye closure and smile  VIII:                    hearing intact to finger rub bilaterally  IX/ X:                  soft palate rise symmetrical  XI:                      head turning, shoulder shrug normal  XII:                     tongue midline    Motor Exam:    Bilateral UE:        5/5 /intrinsics                            5/5 biceps/triceps/wrist extensors-flexors/deltoid                            negative pronator drift      Bilateral LE:        4/5 hip flexors/adductors/abductors                            4+/5 quadriceps/hamstrings                            5/5 dorsiflexors/plantar flexors/invertors-evertors    Sensory: intact to LT/PP in all UE/LE dermatomes    DTR:       = biceps/     triceps/     brachioradialis                =  patella/   medial hamstring/    ankle                    neg clonus                    neg Babinski                    neg Hoffmans    Finger to Nose: wnl    Rapid Alternating movements:  wnl    Joint Position Sense:  intact    Gait:  NT        PM&R Impression:    1) deconditioned  2) Right groin pain secondary to ? hip flexor injury vs hernia  3) gait dysfunction      Recommendations:    1) Physical therapy focusing on therapeutic exercises, bed mobility/transfer out of bed evaluation, progressive ambulation with assistive devices.    2) Disposition Plan: recommend subacute rehab placement

## 2017-02-22 NOTE — PROGRESS NOTE ADULT - PROBLEM SELECTOR PLAN 7
Diabetic, dash/tlc diet. Replete k and mg PRN. Pt is s/p CABG in 2005. No current chest pain  - c/w asa 81mg qday  - c/w lipitor 10mg qday A1c 6.5%.   - ISS, will adjust insulin depending on coverage.

## 2017-02-22 NOTE — PHYSICAL THERAPY INITIAL EVALUATION ADULT - PERTINENT HX OF CURRENT PROBLEM, REHAB EVAL
84 yo M with PMH of CAD s/p CABG, DM, hypothyroidism, multiple hernias x4, cataracts and BPH presents with one day of weakness and lethargy. Pt says weakness started last night when he was unable to get up from his chair due to RLQ pain, well localized over the right inguinal ligament. Pain does not radiate and is worse with movement and feels better when he pushes on the area with his hand, not worse with cough. Please refer to H&P on [place] for remaining.

## 2017-02-22 NOTE — PHYSICAL THERAPY INITIAL EVALUATION ADULT - PHYSICAL ASSIST/NONPHYSICAL ASSIST: STAND/SIT, REHAB EVAL
nonverbal cues (demo/gestures)/Manual cueing to place hand on chair arm-rests prior to sitting/1 person assist

## 2017-02-22 NOTE — PROGRESS NOTE ADULT - PROBLEM SELECTOR PLAN 5
B/l chronic leg swelling with 3+ edema 3/4 lower leg. A/w warm, erythematous, scaly rash, with hemosiderin deposition. Calf tenderness b/l. Venous stasis vs cellulitis vs DVT.  -lasix 40 mg PO daily  -b/l doppler US  -leg elevation. Pt has h/o hypothyroidism, told by PMD to stop synthroid.  - TSH was 0.343  - will continue to hold synthroid B/l chronic leg swelling most likely 2/2 venous insufficiency.  Less likely cellulitis as skin findings show scaly rash, with hemosiderin deposition and are more c/w venous insufficiency.   -lasix 40 mg PO daily, now with improvement in LE swelling  -leg elevation, compression stockings  - will obtain collateral information from cardiologist (recent echo report if available) to determine if pt has h/o CHF

## 2017-02-22 NOTE — PHYSICAL THERAPY INITIAL EVALUATION ADULT - GAIT DEVIATIONS NOTED, PT EVAL
decreased weight-shifting ability/decreased step length/decreased velocity of limb motion/decreased obed/unsteady gait, mod assist to maintain dynamic balance, significantly decreased B/L toe-to-floor clearance, patient required about 1 minute to completed bed to chair transfer

## 2017-02-22 NOTE — CONSULT NOTE ADULT - ASSESSMENT
84 yo M with PMH of CAD s/p CABG, DM, hypothyroidism, multiple hernias x4, cataracts and BPH presents with one day of weakness and lethargy. Pt says weakness started last night when he was unable to get up from his chair due to RLQ pain, well localized over the right inguinal ligament. Pain does not radiate and is worse with movement and feels better when he pushes on the area with his hand, not worse with cough. Per wife, pain started after lifting a heavy object in January and has not improved since then. He also has both leg swelling with erythema. Found to have A1H1, was given oseltamivir. He was given tylenol PRN RLQ pain and will receive consultation from GS. He was also give furosemide for his leg swelling and will receive Doppler US today.     Problem/Plan - 1:  ·  Problem: Inguinal pain, right.  Plan: Sudden onset, severe, sharp, well-localized, non-radiating right inguinal pain for 1 day with h/o multiple inguinal hernia repairs. Physical exam c/w TTP in the right inguinal area. No appreciated inguinal hernia on exam.   CT a/p showed diverticulosis, mild bladder wall thickening which could be due to underdistention versus cystitis, no hydronephrosis, no nephrolithiasis, and normal appendix.   Right inguinal pain most likely 2/2 muscle strain vs. ligament strain. Pt wife reports that pain began after lifting heavy object in January and has not subsided. Otherwise, unclear etiology at this time.  Low likelihood of diverticulitis or appendicitis given CT a/p report.  No hernia on exam.   - will continue to monitor and offer tylenol PRN for pain.  - seeing GS consultation..     Problem/Plan - 2:  ·  Problem: DM (diabetes mellitus).  Plan: - f/u A1c  - ISS, will adjust insulin depending on coverage..     Problem/Plan - 3:  ·  Problem: CAD (coronary artery disease).  Plan: Pt is s/p CABG in 2005. No current chest pain  - c/w asa 81mg qday  - c/w lipitor 10mg qday.     Problem/Plan - 4:  ·  Problem: Influenza A H1N1 infection.  Plan: Pt has 1 day of lethargy and weakness. Febrile to 100.6 in ED, fever resolved with acetaminophen. Found to be influenza positive and started on tamifu.   -c/w tamifu for 5 days   -c/w IVF NS  -acetaminophen PRN for temp >100.4  -isolation..

## 2017-02-22 NOTE — PROGRESS NOTE ADULT - ASSESSMENT
83yo M, PMH of CAD s/p CABG, multiple hernias x4, DM, hypothyroidism, cataracts, BPH, chronic leg swelling. P/w generalized weakness and lethargy. Found to have influenza positive. Admitted for physical deconditioning, unable to ambulate in the setting of influenza.

## 2017-02-22 NOTE — PROGRESS NOTE ADULT - SUBJECTIVE AND OBJECTIVE BOX
Patient feeling improved from admission.  Weakness improved.  Denies cp, sob, n/v/d/c.  No overnight events.  ROS negative.    Vital Signs Last 24 Hrs  T(C): 36.7, Max: 37.9 (02-21 @ 22:13)  T(F): 98.1, Max: 100.2 (02-21 @ 22:13)  HR: 89 (77 - 96)  BP: 121/57 (107/56 - 149/72)  BP(mean): --  RR: 20 (18 - 24)  SpO2: 97% (96% - 100%)    PHYSICAL EXAMINATION  * General: Not in acute distress. Awake and alert. Lying comfortably in bed.  * Head: Normocephalic, atraumatic.  * HEENT: ears no discharge, eyes PERRLA, nose no discharge, throat no exudates, normal tonsils.  * Neck: no JVD, supple.  * Lungs: Clear to auscultation, no rales, no wheezes.  * Cardio: Regular rate and rhythm, no murmurs, no rubs, no gallops. Good peripheral pulses.  * Abdomen: Soft, non-tender, non-distended, tympanic to percussion, no rebound, no guarding, no rigidity. Bowel sounds present. No suprapubic or CVA tenderness.  * : Deferred. [place] catheter in place.  * Extremities: Acyanotic, no edema.  * Skin: Warm and dry.  * Neuro: Alert and oriented x 3. No focal deficits. Motor strength is 5/5 throughout. Sensation intact. Cranial nerves II-XII grossly intact.                           11.1   4.2   )-----------( 102      ( 22 Feb 2017 06:46 )             33.7     22 Feb 2017 06:46    140    |  107    |  16     ----------------------------<  120    4.0     |  25     |  0.90     Ca    8.3        22 Feb 2017 06:46  Mg     1.9       22 Feb 2017 06:46    TPro  7.4    /  Alb  3.9    /  TBili  0.5    /  DBili  x      /  AST  16     /  ALT  20     /  AlkPhos  67     21 Feb 2017 12:20    MEDICATIONS  (STANDING):  heparin  Injectable 5000Unit(s) SubCutaneous every 8 hours  insulin lispro (HumaLOG) corrective regimen sliding scale  SubCutaneous Before meals and at bedtime  pantoprazole    Tablet 40milliGRAM(s) Oral before breakfast  aspirin enteric coated 81milliGRAM(s) Oral daily  atorvastatin 10milliGRAM(s) Oral at bedtime  oseltamivir 75milliGRAM(s) Oral two times a day  furosemide    Tablet 40milliGRAM(s) Oral daily  tamsulosin 0.4milliGRAM(s) Oral at bedtime  polyethylene glycol 3350 17Gram(s) Oral daily  docusate sodium 100milliGRAM(s) Oral daily  senna 1Tablet(s) Oral daily    MEDICATIONS  (PRN):  acetaminophen   Tablet 650milliGRAM(s) Oral every 6 hours PRN For Temp greater than 38 C (100.4 F)  ALBUTerol    90 MICROgram(s) HFA Inhaler 1Puff(s) Inhalation four times a day PRN Respiratory Distress  acetaminophen   Tablet 650milliGRAM(s) Oral every 6 hours PRN moderate pain

## 2017-02-22 NOTE — PROGRESS NOTE ADULT - PROBLEM SELECTOR PLAN 2
- f/u A1c  - ISS, will adjust insulin depending on coverage. Pt has no UR symptoms, however did test positive for influenza.  - continue droplet precautions  - continue tamiflu 75 BID for 5 days (day 2/5)

## 2017-02-22 NOTE — PROGRESS NOTE ADULT - PROBLEM SELECTOR PROBLEM 7
Nutrition, metabolism, and development symptoms CAD (coronary artery disease) DM (diabetes mellitus)

## 2017-02-22 NOTE — PROGRESS NOTE ADULT - PROBLEM SELECTOR PLAN 8
Pt has h/o hypothyroidism, but was told to stop his levothyroxine recently. Weakness may be 2/2 hypthyroidism vs. influenza +  - f/u TSH. HSQ q8h. Pt is s/p CABG in 2005. No current chest pain  - c/w asa 81mg qday  - c/w lipitor 10mg qday

## 2017-02-22 NOTE — PROGRESS NOTE ADULT - PROBLEM SELECTOR PLAN 4
Pt has 1 day of lethargy and weakness. Febrile to 100.6 in ED, fever resolved with acetaminophen. Found to be influenza positive and started on tamifu.   -c/w tamifu for 5 days   -c/w IVF NS  -acetaminophen PRN for temp >100.4  -isolation. B/l chronic leg swelling most likely 2/2 venous insufficiency.  Less likely cellulitis as skin findings show scaly rash, with hemosiderin deposition and are more c/w venous insufficiency.   -lasix 40 mg PO daily, now with improvement in LE swelling  -leg elevation, compression stockings B/l chronic leg swelling most likely 2/2 venous insufficiency.  Less likely cellulitis as skin findings show scaly rash, with hemosiderin deposition and are more c/w venous insufficiency.   -lasix 40 mg PO daily, now with improvement in LE swelling  -leg elevation, compression stockings  - will obtain collateral information from cardiologist (recent echo report if available) to determine if pt has h/o CHF Pt without BM for last couple of days  - started colace, senna, miralax

## 2017-02-22 NOTE — PHYSICAL THERAPY INITIAL EVALUATION ADULT - MANUAL MUSCLE TESTING RESULTS, REHAB EVAL
Grossly assessed with functional movement, bilateral UE/LE greater than or equal to 3+/5. Patient required assist to lift bilateral LEs off bed surface however able to maintain static standing for about 5 minutes without B/L LE buckling

## 2017-02-22 NOTE — PROGRESS NOTE ADULT - PROBLEM SELECTOR PLAN 1
Physical deconditioning. Improved. Likely 2/2 viral syndrome in the setting of influenza.   * PT recs FAUSTINA.

## 2017-02-22 NOTE — PHYSICAL THERAPY INITIAL EVALUATION ADULT - ORIENTATION, REHAB EVAL
place/Oriented to "2017" however states "December". Patient re-oriented to "Wednesday, February 22, 2017."/person

## 2017-02-22 NOTE — PROGRESS NOTE ADULT - PROBLEM SELECTOR PLAN 1
Sudden onset, severe, sharp, well-localized, non-radiating right inguinal pain for 1 day with h/o multiple inguinal hernia repairs. Physical exam c/w TTP in the right inguinal area. No appreciated inguinal hernia on exam.   CT a/p showed diverticulosis, mild bladder wall thickening which could be due to underdistention versus cystitis, no hydronephrosis, no nephrolithiasis, and normal appendix.   Right inguinal pain most likely 2/2 muscle strain vs. ligament strain. Pt wife reports that pain began after lifting heavy object in January and has not subsided. Otherwise, unclear etiology at this time.  Low likelihood of diverticulitis or appendicitis given CT a/p report.  No hernia on exam.   - will continue to monitor and offer tylenol PRN for pain.  - seeing [facility] consultation. Right inguinal pain most likely 2/2 muscle strain vs ligament strain. Physical exam reveals TTP in the right inguinal area with no appreciated hernias.    Low likelihood of diverticulitis or appendicitis given CT a/p impression. CT a/p significant for small fat containing hernia in umbilicus, but no other evidence of hernias.   - tylenol PRN for pain  - will not consult surgery at this time as no evidence of acute strangulating hernia

## 2017-02-22 NOTE — PHYSICAL THERAPY INITIAL EVALUATION ADULT - IMPAIRED TRANSFERS: SIT/STAND, REHAB EVAL
decreased flexibility/decreased strength/impaired coordination/impaired balance/impaired postural control

## 2017-02-23 DIAGNOSIS — D61.818 OTHER PANCYTOPENIA: ICD-10-CM

## 2017-02-23 LAB
ANION GAP SERPL CALC-SCNC: 6 MMOL/L — LOW (ref 9–16)
BUN SERPL-MCNC: 18 MG/DL — SIGNIFICANT CHANGE UP (ref 7–23)
CALCIUM SERPL-MCNC: 7.9 MG/DL — LOW (ref 8.5–10.5)
CHLORIDE SERPL-SCNC: 105 MMOL/L — SIGNIFICANT CHANGE UP (ref 96–108)
CO2 SERPL-SCNC: 27 MMOL/L — SIGNIFICANT CHANGE UP (ref 22–31)
CREAT SERPL-MCNC: 0.83 MG/DL — SIGNIFICANT CHANGE UP (ref 0.5–1.3)
EOSINOPHIL NFR BLD AUTO: 7 % — HIGH (ref 0–6)
GLUCOSE SERPL-MCNC: 102 MG/DL — HIGH (ref 70–99)
HCT VFR BLD CALC: 33.8 % — LOW (ref 39–50)
HGB BLD-MCNC: 11.1 G/DL — LOW (ref 13–17)
LYMPHOCYTES # BLD AUTO: 35 % — SIGNIFICANT CHANGE UP (ref 13–44)
MAGNESIUM SERPL-MCNC: 2.1 MG/DL — SIGNIFICANT CHANGE UP (ref 1.6–2.4)
MANUAL SMEAR VERIFICATION: SIGNIFICANT CHANGE UP
MCHC RBC-ENTMCNC: 29.8 PG — SIGNIFICANT CHANGE UP (ref 27–34)
MCHC RBC-ENTMCNC: 32.8 G/DL — SIGNIFICANT CHANGE UP (ref 32–36)
MCV RBC AUTO: 90.6 FL — SIGNIFICANT CHANGE UP (ref 80–100)
MONOCYTES NFR BLD AUTO: 24 % — HIGH (ref 2–14)
NEUTROPHILS NFR BLD AUTO: 21 % — LOW (ref 43–77)
NEUTS BAND # BLD: 7 % — SIGNIFICANT CHANGE UP
PLAT MORPH BLD: (no result)
PLATELET # BLD AUTO: 106 K/UL — LOW (ref 150–400)
POTASSIUM SERPL-MCNC: 3.8 MMOL/L — SIGNIFICANT CHANGE UP (ref 3.5–5.3)
POTASSIUM SERPL-SCNC: 3.8 MMOL/L — SIGNIFICANT CHANGE UP (ref 3.5–5.3)
RBC # BLD: 3.73 M/UL — LOW (ref 4.2–5.8)
RBC # FLD: 14.1 % — SIGNIFICANT CHANGE UP (ref 10.3–16.9)
RBC BLD AUTO: NORMAL — SIGNIFICANT CHANGE UP
SODIUM SERPL-SCNC: 138 MMOL/L — SIGNIFICANT CHANGE UP (ref 135–145)
VARIANT LYMPHS # BLD: 6 % — HIGH
WBC # BLD: 2.9 K/UL — LOW (ref 3.8–10.5)
WBC # FLD AUTO: 2.9 K/UL — LOW (ref 3.8–10.5)

## 2017-02-23 PROCEDURE — 99232 SBSQ HOSP IP/OBS MODERATE 35: CPT

## 2017-02-23 PROCEDURE — 93306 TTE W/DOPPLER COMPLETE: CPT | Mod: 26

## 2017-02-23 RX ORDER — LISINOPRIL 2.5 MG/1
2.5 TABLET ORAL DAILY
Qty: 0 | Refills: 0 | Status: DISCONTINUED | OUTPATIENT
Start: 2017-02-23 | End: 2017-02-24

## 2017-02-23 RX ORDER — METOPROLOL TARTRATE 50 MG
12.5 TABLET ORAL DAILY
Qty: 0 | Refills: 0 | Status: DISCONTINUED | OUTPATIENT
Start: 2017-02-23 | End: 2017-02-24

## 2017-02-23 RX ORDER — ATORVASTATIN CALCIUM 80 MG/1
40 TABLET, FILM COATED ORAL AT BEDTIME
Qty: 0 | Refills: 0 | Status: DISCONTINUED | OUTPATIENT
Start: 2017-02-23 | End: 2017-02-24

## 2017-02-23 RX ORDER — METOPROLOL TARTRATE 50 MG
12.5 TABLET ORAL DAILY
Qty: 0 | Refills: 0 | Status: DISCONTINUED | OUTPATIENT
Start: 2017-02-23 | End: 2017-02-23

## 2017-02-23 RX ADMIN — LISINOPRIL 2.5 MILLIGRAM(S): 2.5 TABLET ORAL at 17:41

## 2017-02-23 RX ADMIN — Medication 81 MILLIGRAM(S): at 12:31

## 2017-02-23 RX ADMIN — HEPARIN SODIUM 5000 UNIT(S): 5000 INJECTION INTRAVENOUS; SUBCUTANEOUS at 23:13

## 2017-02-23 RX ADMIN — PANTOPRAZOLE SODIUM 40 MILLIGRAM(S): 20 TABLET, DELAYED RELEASE ORAL at 06:27

## 2017-02-23 RX ADMIN — Medication 4: at 09:37

## 2017-02-23 RX ADMIN — Medication 40 MILLIGRAM(S): at 06:27

## 2017-02-23 RX ADMIN — Medication 12.5 MILLIGRAM(S): at 23:29

## 2017-02-23 RX ADMIN — Medication 4: at 12:31

## 2017-02-23 RX ADMIN — SENNA PLUS 1 TABLET(S): 8.6 TABLET ORAL at 12:31

## 2017-02-23 RX ADMIN — Medication 75 MILLIGRAM(S): at 17:42

## 2017-02-23 RX ADMIN — HEPARIN SODIUM 5000 UNIT(S): 5000 INJECTION INTRAVENOUS; SUBCUTANEOUS at 06:27

## 2017-02-23 RX ADMIN — Medication 2: at 23:13

## 2017-02-23 RX ADMIN — HEPARIN SODIUM 5000 UNIT(S): 5000 INJECTION INTRAVENOUS; SUBCUTANEOUS at 14:37

## 2017-02-23 RX ADMIN — Medication 100 MILLIGRAM(S): at 12:31

## 2017-02-23 RX ADMIN — ATORVASTATIN CALCIUM 40 MILLIGRAM(S): 80 TABLET, FILM COATED ORAL at 23:13

## 2017-02-23 RX ADMIN — POLYETHYLENE GLYCOL 3350 17 GRAM(S): 17 POWDER, FOR SOLUTION ORAL at 12:31

## 2017-02-23 RX ADMIN — Medication 75 MILLIGRAM(S): at 06:53

## 2017-02-23 NOTE — PROGRESS NOTE ADULT - SUBJECTIVE AND OBJECTIVE BOX
Patient feeling improved.  Weakness improved.  Denies cp, sob, n/v/d/c.  No overnight events.  ROS negative.    Vital Signs Last 24 Hrs  T(C): 36.6, Max: 36.8 (02-22 @ 21:48)  T(F): 97.9, Max: 98.2 (02-22 @ 21:48)  HR: 88 (66 - 88)  BP: 135/67 (108/61 - 136/71)  BP(mean): --  RR: 17 (16 - 18)  SpO2: 96% (95% - 96%)    PHYSICAL EXAMINATION  * General: Not in acute distress. Awake and alert. Lying comfortably in bed.  * Head: Normocephalic, atraumatic.  * HEENT: ears no discharge, eyes PERRLA, nose no discharge, throat no exudates, normal tonsils.  * Neck: no JVD, supple.  * Lungs: Clear to auscultation, no rales, no wheezes.  * Cardio: Regular rate and rhythm, no murmurs, no rubs, no gallops. Good peripheral pulses.  * Abdomen: Soft, non-tender, non-distended, tympanic to percussion, no rebound, no guarding, no rigidity. Bowel sounds present. No suprapubic or CVA tenderness.  * : Deferred. [place] catheter in place.  * Extremities: Acyanotic, no edema.  * Skin: Warm and dry.  * Neuro: Alert and oriented x 3. No focal deficits. Motor strength is 5/5 throughout. Sensation intact. Cranial nerves II-XII grossly intact.                                      11.1   2.9   )-----------( 106      ( 23 Feb 2017 06:24 )             33.8   23 Feb 2017 06:23    138    |  105    |  18     ----------------------------<  102    3.8     |  27     |  0.83     Ca    7.9        23 Feb 2017 06:23  Mg     2.1       23 Feb 2017 06:23    MEDICATIONS  (STANDING):  heparin  Injectable 5000Unit(s) SubCutaneous every 8 hours  insulin lispro (HumaLOG) corrective regimen sliding scale  SubCutaneous Before meals and at bedtime  pantoprazole    Tablet 40milliGRAM(s) Oral before breakfast  aspirin enteric coated 81milliGRAM(s) Oral daily  atorvastatin 10milliGRAM(s) Oral at bedtime  oseltamivir 75milliGRAM(s) Oral two times a day  furosemide    Tablet 40milliGRAM(s) Oral daily  tamsulosin 0.4milliGRAM(s) Oral at bedtime  polyethylene glycol 3350 17Gram(s) Oral daily  docusate sodium 100milliGRAM(s) Oral daily  senna 1Tablet(s) Oral daily    MEDICATIONS  (PRN):  acetaminophen   Tablet 650milliGRAM(s) Oral every 6 hours PRN For Temp greater than 38 C (100.4 F)  ALBUTerol    90 MICROgram(s) HFA Inhaler 1Puff(s) Inhalation four times a day PRN Respiratory Distress  acetaminophen   Tablet 650milliGRAM(s) Oral every 6 hours PRN moderate pain

## 2017-02-23 NOTE — PROGRESS NOTE ADULT - PROBLEM SELECTOR PLAN 3
Weakness most likely 2/2 overall deconditioning and worsened by influenza  - pt states that over the last 2-3 months, he has not ambulated or done much physical activity 2/2 right inguinal pain. As of today, he is more willing to move with help and to get discharged.   - consulted with PT, awaiting FAUSTINA

## 2017-02-23 NOTE — PROGRESS NOTE ADULT - SUBJECTIVE AND OBJECTIVE BOX
INTERVAL HPI/OVERNIGHT EVENTS:  No overnight events. Pt has continued sharp right inguinal pain that has become more moderate (pain scale: 9,8 --> 6,7) Has had one bm overnight, stool looser than usual. Still feels weakness, but is more willing to move and get discharged.     VITAL SIGNS:  T(F): 97.9  HR: 88  BP: 135/67  RR: 17  SpO2: 96%  Wt(kg): 68.6    PHYSICAL EXAM:    Constitutional: patient sitting on his bed and looking tolerable and in NAD  Eyes: PERRL, EOMI, sclera non-icteric  Neck: supple, no masses, no JVD  Respiratory: CTA b/l, good air entry b/l, no wheezing, rhonchi, rales, with normal respiratory effort and no intercostal retractions  Cardiovascular: RRR, normal S1S2, 3/6 systolic murmur on LUSB  Gastrointestinal: soft, NTND, no masses palpable, BS normal in all four quadrants, no HSM  Extremities: WWP, DP/radial pulses 2+ b/l, 2+ pitting edema from feet to mid-calf bilaterally  Neurological: AAOx3  Skin: mildly erythematous dry skin on b/l LE     MEDICATIONS  (STANDING):  heparin  Injectable 5000Unit(s) SubCutaneous every 8 hours  insulin lispro (HumaLOG) corrective regimen sliding scale  SubCutaneous Before meals and at bedtime  pantoprazole    Tablet 40milliGRAM(s) Oral before breakfast  aspirin enteric coated 81milliGRAM(s) Oral daily  atorvastatin 10milliGRAM(s) Oral at bedtime  oseltamivir 75milliGRAM(s) Oral two times a day  furosemide    Tablet 40milliGRAM(s) Oral daily  tamsulosin 0.4milliGRAM(s) Oral at bedtime  polyethylene glycol 3350 17Gram(s) Oral daily  docusate sodium 100milliGRAM(s) Oral daily  senna 1Tablet(s) Oral daily    MEDICATIONS  (PRN):  acetaminophen   Tablet 650milliGRAM(s) Oral every 6 hours PRN For Temp greater than 38 C (100.4 F)  ALBUTerol    90 MICROgram(s) HFA Inhaler 1Puff(s) Inhalation four times a day PRN Respiratory Distress  acetaminophen   Tablet 650milliGRAM(s) Oral every 6 hours PRN moderate pain      Allergies:     No Known Allergies    Intolerances: No Known Intolerances       LABS:                        11.1   2.9   )-----------( 106      ( 2017 06:24 )             33.8     2017 06:23    138    |  105    |  18     ----------------------------<  102    3.8     |  27     |  0.83     Ca    7.9        2017 06:23  Mg     2.1       2017 06:23    TPro  7.4    /  Alb  3.9    /  TBili  0.5    /  DBili  x      /  AST  16     /  ALT  20     /  AlkPhos  67     2017 12:20      Urinalysis Basic - ( 2017 15:38 )    Color: Lorin / Appearance: Clear / S.015 / pH: x  Gluc: x / Ketone: NEGATIVE  / Bili: NEGATIVE / Urobili: 0.2 E.U./dL   Blood: x / Protein: NEGATIVE mg/dL / Nitrite: NEGATIVE   Leuk Esterase: NEGATIVE / RBC: < 5 /HPF / WBC < 5 /HPF   Sq Epi: x / Non Sq Epi: Rare /HPF / Bacteria: None Seen /HPF      RADIOLOGY & ADDITIONAL TESTS:  CT Abdomen and Pelvis (17@ 15:39)   IMPRESSION:  1.  No hydronephrosis or nephrolithiasis.  2.  Normal appendix.  3.  Colonic diverticulosis without evidence of diverticulitis.  4.  Cholelithiasis.    US DPLX LWR EXT VEINS COMPL BI (17 @ 12:23)   IMPRESSION:  No deep vein thrombosis seen.

## 2017-02-23 NOTE — PROGRESS NOTE ADULT - ASSESSMENT
81yo M, PMH of CAD s/p CABG, multiple hernias x4, DM, hypothyroidism, cataracts, BPH, chronic leg swelling. P/w generalized weakness and lethargy. Found to have influenza positive. Admitted for physical deconditioning, unable to ambulate in the setting of influenza.

## 2017-02-23 NOTE — PROGRESS NOTE ADULT - ASSESSMENT
84 yo M with PMH of CAD s/p CABG, DM, hypothyroidism, multiple hernias x4, cataracts and BPH presents with weakness and right inguinal pain, found to be positive for influenza, now with improvement in right inguinal pain.

## 2017-02-23 NOTE — DIETITIAN INITIAL EVALUATION ADULT. - OTHER INFO
81y/o male admitted with weakness and pedal edema.Noted fevers.Presently eating 100%.Reported he watches his sugar intake at home and is aware of CHO sources in diet

## 2017-02-23 NOTE — PROGRESS NOTE ADULT - PROBLEM SELECTOR PLAN 8
Pt has h/o hypothyroidism, told by PMD to stop synthroid.  - TSH was 0.343  - will continue to hold synthroid

## 2017-02-23 NOTE — PROGRESS NOTE ADULT - PROBLEM SELECTOR PLAN 2
Plan: Pt has no UR symptoms besides runny nose, however did test positive for influenza.  - continue droplet precautions  - continue tamiflu 75 BID for 5 days (day 3/5).

## 2017-02-23 NOTE — PROGRESS NOTE ADULT - PROBLEM SELECTOR PLAN 1
Right inguinal pain most likely 2/2 muscle strain vs ligament strain. Physical exam reveals TTP in the right inguinal area with no appreciated hernias.    Low likelihood of diverticulitis or appendicitis given CT a/p impression. CT a/p significant for small fat containing hernia in umbilicus, but no other evidence of hernias.   - tylenol PRN for pain

## 2017-02-23 NOTE — PROGRESS NOTE ADULT - PROBLEM SELECTOR PLAN 4
B/l chronic leg swelling most likely 2/2 venous insufficiency.  Less likely cellulitis as skin findings show scaly rash, with hemosiderin deposition and are more c/w chronic venous insufficiency. Doppler US finding shows no evidence of DVT. Edema improved (+++ --> +)   - c/w lasix 40 mg PO daily, now with improvement in LE swelling  - leg elevation, compression stockings  - per his cardiologist, confirmed chronic leg edema, and his last echo (Oct 2015) revealed EF 40% and mild aortic stenosis, recommended repeat echo and requested that he gets an access to the recent results

## 2017-02-24 ENCOUNTER — TRANSCRIPTION ENCOUNTER (OUTPATIENT)
Age: 82
End: 2017-02-24

## 2017-02-24 VITALS — WEIGHT: 151.24 LBS

## 2017-02-24 DIAGNOSIS — I50.22 CHRONIC SYSTOLIC (CONGESTIVE) HEART FAILURE: ICD-10-CM

## 2017-02-24 PROCEDURE — 81003 URINALYSIS AUTO W/O SCOPE: CPT

## 2017-02-24 PROCEDURE — 97161 PT EVAL LOW COMPLEX 20 MIN: CPT

## 2017-02-24 PROCEDURE — 85025 COMPLETE CBC W/AUTO DIFF WBC: CPT

## 2017-02-24 PROCEDURE — 87040 BLOOD CULTURE FOR BACTERIA: CPT

## 2017-02-24 PROCEDURE — 83036 HEMOGLOBIN GLYCOSYLATED A1C: CPT

## 2017-02-24 PROCEDURE — 87486 CHLMYD PNEUM DNA AMP PROBE: CPT

## 2017-02-24 PROCEDURE — 93010 ELECTROCARDIOGRAM REPORT: CPT

## 2017-02-24 PROCEDURE — 36415 COLL VENOUS BLD VENIPUNCTURE: CPT

## 2017-02-24 PROCEDURE — 87798 DETECT AGENT NOS DNA AMP: CPT

## 2017-02-24 PROCEDURE — 74176 CT ABD & PELVIS W/O CONTRAST: CPT

## 2017-02-24 PROCEDURE — 87086 URINE CULTURE/COLONY COUNT: CPT

## 2017-02-24 PROCEDURE — 99285 EMERGENCY DEPT VISIT HI MDM: CPT | Mod: 25

## 2017-02-24 PROCEDURE — C8929: CPT

## 2017-02-24 PROCEDURE — 83735 ASSAY OF MAGNESIUM: CPT

## 2017-02-24 PROCEDURE — 93005 ELECTROCARDIOGRAM TRACING: CPT

## 2017-02-24 PROCEDURE — 80048 BASIC METABOLIC PNL TOTAL CA: CPT

## 2017-02-24 PROCEDURE — 81001 URINALYSIS AUTO W/SCOPE: CPT

## 2017-02-24 PROCEDURE — 93970 EXTREMITY STUDY: CPT

## 2017-02-24 PROCEDURE — 87581 M.PNEUMON DNA AMP PROBE: CPT

## 2017-02-24 PROCEDURE — 85027 COMPLETE CBC AUTOMATED: CPT

## 2017-02-24 PROCEDURE — 87633 RESP VIRUS 12-25 TARGETS: CPT

## 2017-02-24 PROCEDURE — 83880 ASSAY OF NATRIURETIC PEPTIDE: CPT

## 2017-02-24 PROCEDURE — 83605 ASSAY OF LACTIC ACID: CPT

## 2017-02-24 PROCEDURE — 99239 HOSP IP/OBS DSCHRG MGMT >30: CPT

## 2017-02-24 PROCEDURE — 84443 ASSAY THYROID STIM HORMONE: CPT

## 2017-02-24 PROCEDURE — 80053 COMPREHEN METABOLIC PANEL: CPT

## 2017-02-24 PROCEDURE — 71046 X-RAY EXAM CHEST 2 VIEWS: CPT

## 2017-02-24 RX ORDER — TAMSULOSIN HYDROCHLORIDE 0.4 MG/1
1 CAPSULE ORAL
Qty: 0 | Refills: 0 | COMMUNITY
Start: 2017-02-24

## 2017-02-24 RX ORDER — ASPIRIN/CALCIUM CARB/MAGNESIUM 324 MG
1 TABLET ORAL
Qty: 0 | Refills: 0 | DISCHARGE
Start: 2017-02-24

## 2017-02-24 RX ORDER — SENNA PLUS 8.6 MG/1
1 TABLET ORAL
Qty: 0 | Refills: 0 | COMMUNITY
Start: 2017-02-24

## 2017-02-24 RX ORDER — DOCUSATE SODIUM 100 MG
1 CAPSULE ORAL
Qty: 0 | Refills: 0 | DISCHARGE
Start: 2017-02-24

## 2017-02-24 RX ORDER — ACETAMINOPHEN 500 MG
2 TABLET ORAL
Qty: 0 | Refills: 0 | DISCHARGE
Start: 2017-02-24

## 2017-02-24 RX ORDER — METOPROLOL TARTRATE 50 MG
12.5 TABLET ORAL
Qty: 0 | Refills: 0 | COMMUNITY

## 2017-02-24 RX ORDER — LISINOPRIL 2.5 MG/1
1 TABLET ORAL
Qty: 0 | Refills: 0 | COMMUNITY
Start: 2017-02-24

## 2017-02-24 RX ORDER — POLYETHYLENE GLYCOL 3350 17 G/17G
17 POWDER, FOR SOLUTION ORAL
Qty: 0 | Refills: 0 | COMMUNITY
Start: 2017-02-24

## 2017-02-24 RX ORDER — ASPIRIN/CALCIUM CARB/MAGNESIUM 324 MG
1 TABLET ORAL
Qty: 0 | Refills: 0 | COMMUNITY

## 2017-02-24 RX ORDER — METOPROLOL TARTRATE 50 MG
12.5 TABLET ORAL
Qty: 0 | Refills: 0 | Status: DISCONTINUED | OUTPATIENT
Start: 2017-02-24 | End: 2017-02-24

## 2017-02-24 RX ORDER — FUROSEMIDE 40 MG
1 TABLET ORAL
Qty: 0 | Refills: 0 | COMMUNITY
Start: 2017-02-24

## 2017-02-24 RX ADMIN — Medication 81 MILLIGRAM(S): at 11:35

## 2017-02-24 RX ADMIN — Medication 12.5 MILLIGRAM(S): at 08:36

## 2017-02-24 RX ADMIN — Medication 100 MILLIGRAM(S): at 11:35

## 2017-02-24 RX ADMIN — Medication 2: at 12:46

## 2017-02-24 RX ADMIN — POLYETHYLENE GLYCOL 3350 17 GRAM(S): 17 POWDER, FOR SOLUTION ORAL at 11:35

## 2017-02-24 RX ADMIN — Medication 40 MILLIGRAM(S): at 06:00

## 2017-02-24 RX ADMIN — SENNA PLUS 1 TABLET(S): 8.6 TABLET ORAL at 11:35

## 2017-02-24 RX ADMIN — LISINOPRIL 2.5 MILLIGRAM(S): 2.5 TABLET ORAL at 06:00

## 2017-02-24 RX ADMIN — Medication 75 MILLIGRAM(S): at 06:00

## 2017-02-24 RX ADMIN — PANTOPRAZOLE SODIUM 40 MILLIGRAM(S): 20 TABLET, DELAYED RELEASE ORAL at 06:01

## 2017-02-24 RX ADMIN — HEPARIN SODIUM 5000 UNIT(S): 5000 INJECTION INTRAVENOUS; SUBCUTANEOUS at 06:00

## 2017-02-24 NOTE — PROGRESS NOTE ADULT - PROBLEM SELECTOR PLAN 10
Diabetic, dash/tlc diet. Replete k and mg PRN.

## 2017-02-24 NOTE — DISCHARGE NOTE ADULT - PATIENT PORTAL LINK FT
“You can access the [facility] Patient Portal, offered by [facility], by registering with the following website: [email or web address]”

## 2017-02-24 NOTE — PROGRESS NOTE ADULT - PROBLEM SELECTOR PLAN 6
s/p CABG in 2005. No current chest pain  * c/w asa, lipitor  * Started on Lisinopril 2.5 and metoprolol 12.5 bid.

## 2017-02-24 NOTE — PROGRESS NOTE ADULT - PROBLEM SELECTOR PLAN 4
Repeat ECHo: EF 40% and severely hypokinetic/akinetic inferior wall.  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  * Start Lisinorpil 2.5, metorplol 12.5 bid.  * c/w asa 81 and lasix.  * Outpatient cardiology is aware.

## 2017-02-24 NOTE — PROGRESS NOTE ADULT - SUBJECTIVE AND OBJECTIVE BOX
Weakness improved.  Denies cp, sob, n/v/d/c.  No overnight events.  ROS negative.    Vital Signs Last 24 Hrs  T(C): 36.3, Max: 36.7 (02-23 @ 21:34)  T(F): 97.4, Max: 98 (02-23 @ 21:34)  HR: 70 (69 - 78)  BP: 105/62 (105/62 - 142/67)  BP(mean): --  RR: 19 (16 - 19)  SpO2: 100% (96% - 100%)    PHYSICAL EXAMINATION  * General: Not in acute distress. Awake and alert. Lying comfortably in bed.  * Head: Normocephalic, atraumatic.  * HEENT: ears no discharge, eyes PERRLA, nose no discharge, throat no exudates, normal tonsils.  * Neck: no JVD, supple.  * Lungs: Clear to auscultation, no rales, no wheezes.  * Cardio: Regular rate and rhythm, no murmurs, no rubs, no gallops. Good peripheral pulses.  * Abdomen: Soft, non-tender, non-distended, tympanic to percussion, no rebound, no guarding, no rigidity. Bowel sounds present. No suprapubic or CVA tenderness.  * : Deferred. [place] catheter in place.  * Extremities: Acyanotic, no edema.  * Skin: Warm and dry.  * Neuro: Alert and oriented x 3. No focal deficits. Motor strength is 5/5 throughout. Sensation intact. Cranial nerves II-XII grossly intact.                           11.1   2.9   )-----------( 106      ( 23 Feb 2017 06:24 )             33.8   23 Feb 2017 06:23    138    |  105    |  18     ----------------------------<  102    3.8     |  27     |  0.83     Ca    7.9        23 Feb 2017 06:23  Mg     2.1       23 Feb 2017 06:23    EKG: NSR, 1st degree AV-block, Left axis dev. old inferior infarct.    Left Ventricle  There is mild concentric left ventricular hypertrophy.  Abnormal (paradoxical) septal motion consistent with post-operative status  The inferoseptal wall and apical septum are akinetic.  The inferior wall   is  severely hypokinetic to akinetic.  The other walls contract normally.  The left ventricular ejection fraction is 40%.  Left Atrium  The left atrial size is normal.  The left atrial volume index is 29 cc/m2 (normal <34cc/m2)  Right Atrium  Right atrial size is normal.  Right Ventricle  Probably normal right ventricular size and function.  The tricuspid annular plane systolic excursion (TAPSE) is 19 mm (normal   17mm  or higher).  Aortic Valve  Calcified aortic valve.  The aortic valve is trileaflet.  There is mild to moderate aortic regurgitation.  Pressure half time measures 411 msec.  No hemodynamically significant valvular aortic stenosis.  Mitral Valve  Mitral valve thickening noted.  There is mild mitral regurgitation.  Tricuspid Valve  There was insufficient TR detected from which to calculate pulmonary   artery  systolic pressure.  Pulmonic Valve  There is mild pulmonic regurgitation.  There is no pulmonic stenosis.  Arteries and Venous System  No aortic root dilatation.  The inferior vena cava is normal in size (<2.1 cm) with normal inspiratory  collapse (>50%) consistent with normal right atrial pressure.  Pericardium / Pleura  There is no pericardial effusion.    MEDICATIONS  (STANDING):  heparin  Injectable 5000Unit(s) SubCutaneous every 8 hours  insulin lispro (HumaLOG) corrective regimen sliding scale  SubCutaneous Before meals and at bedtime  pantoprazole    Tablet 40milliGRAM(s) Oral before breakfast  aspirin enteric coated 81milliGRAM(s) Oral daily  oseltamivir 75milliGRAM(s) Oral two times a day  furosemide    Tablet 40milliGRAM(s) Oral daily  tamsulosin 0.4milliGRAM(s) Oral at bedtime  polyethylene glycol 3350 17Gram(s) Oral daily  IMPRESSION:  No deep vein thrombosis seen.IMPRESSION:  No deep vein thrombosis seen.docusate sodium 100milliGRAM(s) Oral daily  senna 1Tablet(s) Oral daily  atorvastatin 40milliGRAM(s) Oral at bedtime  lisinopril 2.5milliGRAM(s) Oral daily  metoprolol 12.5milliGRAM(s) Oral two times a day    MEDICATIONS  (PRN):  acetaminophen   Tablet 650milliGRAM(s) Oral every 6 hours PRN For Temp greater than 38 C (100.4 F)  ALBUTerol    90 MICROgram(s) HFA Inhaler 1Puff(s) Inhalation four times a day PRN Respiratory Distress  acetaminophen   Tablet 650milliGRAM(s) Oral every 6 hours PRN moderate pain

## 2017-02-24 NOTE — DISCHARGE NOTE ADULT - PLAN OF CARE
Resolution of symptoms You were diagnosed with the flu. We treated you with 4 days of tamiflu. Please take 1 more day of tamiflu to complete a 5 day course. You presented with bilateral leg swelling which was attributed to venous insufficiency and heart failure. We performed a test called a doppler scan which did not show evidence of a clot. Please continue taking your tradjenta. Please take stool softeneers as needed for constipation You had an echocardiogram performed which showed your ejection fraction of 40% and wall motion abnormalities. You were started on two new medications. Please take the following:  - metoprolol 12.5mg by mouth, twice daily   - lisinopril 2.5mg by mouth, once daily   - lasix 40mg by mouth, once daily

## 2017-02-24 NOTE — DISCHARGE NOTE ADULT - CARE PLAN
Principal Discharge DX:	Influenza  Goal:	Resolution of symptoms  Instructions for follow-up, activity and diet:	You were diagnosed with the flu. We treated you with 4 days of tamiflu. Please take 1 more day of tamiflu to complete a 5 day course.  Secondary Diagnosis:	Leg swelling  Instructions for follow-up, activity and diet:	You presented with bilateral leg swelling which was attributed to venous insufficiency and heart failure. We performed a test called a doppler scan which did not show evidence of a clot.  Secondary Diagnosis:	DM (diabetes mellitus)  Instructions for follow-up, activity and diet:	Please continue taking your tradjenta.  Secondary Diagnosis:	Constipation  Instructions for follow-up, activity and diet:	Please take stool softeneers as needed for constipation  Secondary Diagnosis:	Chronic systolic congestive heart failure  Instructions for follow-up, activity and diet:	You had an echocardiogram performed which showed your ejection fraction of 40% and wall motion abnormalities. You were started on two new medications. Please take the following:  - metoprolol 12.5mg by mouth, twice daily   - lisinopril 2.5mg by mouth, once daily   - lasix 40mg by mouth, once daily

## 2017-02-24 NOTE — DISCHARGE NOTE ADULT - ADDITIONAL INSTRUCTIONS
Please follow-up with your cardiologist Dr. Brand in 2 weeks for further management of your heart failure.

## 2017-02-24 NOTE — DISCHARGE NOTE ADULT - MEDICATION SUMMARY - MEDICATIONS TO TAKE
I will START or STAY ON the medications listed below when I get home from the hospital:    acetaminophen 325 mg oral tablet  -- 2 tab(s) by mouth every 6 hours, As needed, moderate pain  -- Indication: For Pain    Aspir 81 oral delayed release tablet  -- 1 tab(s) by mouth once a day  -- Indication: For CAD (coronary artery disease)    lisinopril 2.5 mg oral tablet  -- 1 tab(s) by mouth once a day  -- Indication: For Systolic CHF    tamsulosin 0.4 mg oral capsule  -- 1 cap(s) by mouth once a day (at bedtime)  -- Indication: For BPH (benign prostatic hyperplasia)    Tradjenta 5 mg oral tablet  -- 1 tab(s) by mouth once a day  -- Indication: For Diabetes Mellitus    Lipitor 10 mg oral tablet  -- 1 tab(s) by mouth once a day  -- Indication: For HLD    oseltamivir 75 mg oral capsule  -- 1 cap(s) by mouth 2 times a day for ONE MORE DAY (completing 5 day course)  -- Indication: For Influenza    Metoprolol Tartrate  -- 12.5 milligram(s) by mouth 2 times a day  -- Indication: For CHF    albuterol 90 mcg/inh inhalation aerosol  -- 1 puff(s) inhaled 4 times a day, As Needed  -- Indication: For Asthma    Hytone  -- Apply on skin to affected area once a day, As Needed  -- Indication: For Venous stasis    fluocinonide 0.05% topical solution  -- Apply on skin to affected area once a day, As Needed  -- Indication: For Venous stasis    furosemide 40 mg oral tablet  -- 1 tab(s) by mouth once a day  -- Indication: For CHF    docusate sodium 100 mg oral capsule  -- 1 cap(s) by mouth once a day  -- Indication: For Constipation    polyethylene glycol 3350 oral powder for reconstitution  -- 17 gram(s) by mouth once a day  -- Indication: For Constipation    senna oral tablet  -- 1 tab(s) by mouth once a day  -- Indication: For Constipation    omeprazole 20 mg oral delayed release capsule  -- 1 cap(s) by mouth once a day, As Needed  -- Indication: For GERD

## 2017-02-24 NOTE — DISCHARGE NOTE ADULT - HOSPITAL COURSE
82 y old M with a history of CAD s/p CABG, DM, hypothyroidism and multiple hernia (x4) who presents for the evaluation of right inguinal pain started a month ago when lifting weights and associated with general weakness. As CT abdomen revealed no evidence of recurrent hernia, diverticulitis or appendicitis, the pain is most likely to be d/t muscle strain vs. ligament strain. Found to be positive for influenza without apparent upper respiratory symptoms. Treated with Tylenol PRN for pain and Tamiflu for 4/5 days. Right inguinal pain improving. As Px showed bilateral leg pitting edema and his PMH of CAD s/p CABG, echo was performed and revealed EF 40% with mild to moderate aortic regurgitation, mild mitral regurgitation and severely hypokinetic to akinetic inferior wall. Ecg showed 1st degree AV block and pathologic q in II, III, aVF, lead V3, 4 consistent with the echo findings. Treated with 12.5 mG of  metoprolol bid and 2.5 mG of lisinopril daily for medical optimization. The results of cardiac workup communicated with his PCP via messaging. Overall, patient stable for discharge with plans to follow up with his PCP, Dr. Brand as outpatient. 82 y old M with a history of CAD s/p CABG, DM, hypothyroidism and multiple hernia (x4) presented for evaluation of right inguinal pain that started a month ago and general weakness.  CT abdomen/pelvis revealed no evidence of recurrent hernia, diverticulitis or appendicitis.  Found to be positive for influenza without apparent upper respiratory symptoms. Treated with Tylenol PRN for pain and Tamiflu for 4/5 days. Right inguinal pain improved.  Physical exam showed bilateral leg pitting edema, dvt ruled-out on doppler. Echo was performed and revealed EF 40% with mild to moderate aortic regurgitation, mild mitral regurgitation and severely hypokinetic to akinetic inferior wall. Ecg showed 1st degree AV block and pathologic q in II, III, aVF, lead V3, 4 consistent with the echo findings. Started patient on metoprolol 12.5mg bid, lisinopril 2.5mg daily, lasix 40mg qday for medical optimization. The results of cardiac workup were communicated with his cardiologist. Overall, patient remained HD stable for discharge to [facility] with plans to follow up with his PCP, Dr. Brand as outpatient.

## 2017-02-24 NOTE — DISCHARGE NOTE ADULT - CARE PROVIDER_API CALL
Aj Brand), Cardiovascular Disease; Internal Medicine; Nuclear Cardiology  [address] [place] [address]  Phone: [phone]  Fax: [phone]

## 2017-02-26 LAB
CULTURE RESULTS: SIGNIFICANT CHANGE UP
CULTURE RESULTS: SIGNIFICANT CHANGE UP
SPECIMEN SOURCE: SIGNIFICANT CHANGE UP
SPECIMEN SOURCE: SIGNIFICANT CHANGE UP

## 2017-02-28 DIAGNOSIS — J10.1 INFLUENZA DUE TO OTHER IDENTIFIED INFLUENZA VIRUS WITH OTHER RESPIRATORY MANIFESTATIONS: ICD-10-CM

## 2017-02-28 DIAGNOSIS — Z51.89 ENCOUNTER FOR OTHER SPECIFIED AFTERCARE: ICD-10-CM

## 2017-02-28 DIAGNOSIS — Z79.82 LONG TERM (CURRENT) USE OF ASPIRIN: ICD-10-CM

## 2017-02-28 DIAGNOSIS — R10.9 UNSPECIFIED ABDOMINAL PAIN: ICD-10-CM

## 2017-02-28 DIAGNOSIS — I25.10 ATHEROSCLEROTIC HEART DISEASE OF NATIVE CORONARY ARTERY WITHOUT ANGINA PECTORIS: ICD-10-CM

## 2017-02-28 DIAGNOSIS — R60.0 LOCALIZED EDEMA: ICD-10-CM

## 2017-02-28 DIAGNOSIS — E03.9 HYPOTHYROIDISM, UNSPECIFIED: ICD-10-CM

## 2017-02-28 DIAGNOSIS — I50.22 CHRONIC SYSTOLIC (CONGESTIVE) HEART FAILURE: ICD-10-CM

## 2017-02-28 DIAGNOSIS — J45.909 UNSPECIFIED ASTHMA, UNCOMPLICATED: ICD-10-CM

## 2017-02-28 DIAGNOSIS — K59.00 CONSTIPATION, UNSPECIFIED: ICD-10-CM

## 2017-02-28 DIAGNOSIS — Z95.1 PRESENCE OF AORTOCORONARY BYPASS GRAFT: ICD-10-CM

## 2017-02-28 DIAGNOSIS — N40.0 BENIGN PROSTATIC HYPERPLASIA WITHOUT LOWER URINARY TRACT SYMPTOMS: ICD-10-CM

## 2017-02-28 DIAGNOSIS — E11.9 TYPE 2 DIABETES MELLITUS WITHOUT COMPLICATIONS: ICD-10-CM

## 2017-02-28 DIAGNOSIS — D61.818 OTHER PANCYTOPENIA: ICD-10-CM

## 2017-03-07 ENCOUNTER — APPOINTMENT (OUTPATIENT)
Dept: SURGERY | Facility: CLINIC | Age: 82
End: 2017-03-07

## 2017-03-12 ENCOUNTER — FORM ENCOUNTER (OUTPATIENT)
Age: 82
End: 2017-03-12

## 2017-03-13 ENCOUNTER — OUTPATIENT (OUTPATIENT)
Dept: OUTPATIENT SERVICES | Facility: HOSPITAL | Age: 82
LOS: 1 days | End: 2017-03-13
Payer: MEDICARE

## 2017-03-13 DIAGNOSIS — Z98.890 OTHER SPECIFIED POSTPROCEDURAL STATES: Chronic | ICD-10-CM

## 2017-03-13 DIAGNOSIS — H26.40 UNSPECIFIED SECONDARY CATARACT: Chronic | ICD-10-CM

## 2017-03-13 DIAGNOSIS — Z95.1 PRESENCE OF AORTOCORONARY BYPASS GRAFT: Chronic | ICD-10-CM

## 2017-03-13 PROCEDURE — 74177 CT ABD & PELVIS W/CONTRAST: CPT

## 2017-03-13 PROCEDURE — 74177 CT ABD & PELVIS W/CONTRAST: CPT | Mod: 26

## 2017-03-16 ENCOUNTER — RX RENEWAL (OUTPATIENT)
Age: 82
End: 2017-03-16

## 2017-03-17 ENCOUNTER — APPOINTMENT (OUTPATIENT)
Dept: HEART AND VASCULAR | Facility: CLINIC | Age: 82
End: 2017-03-17

## 2017-03-17 VITALS
SYSTOLIC BLOOD PRESSURE: 110 MMHG | DIASTOLIC BLOOD PRESSURE: 60 MMHG | OXYGEN SATURATION: 97 % | TEMPERATURE: 97.5 F | HEART RATE: 77 BPM

## 2017-04-06 ENCOUNTER — LABORATORY RESULT (OUTPATIENT)
Age: 82
End: 2017-04-06

## 2017-04-06 ENCOUNTER — APPOINTMENT (OUTPATIENT)
Dept: HEART AND VASCULAR | Facility: CLINIC | Age: 82
End: 2017-04-06

## 2017-04-06 ENCOUNTER — CLINICAL ADVICE (OUTPATIENT)
Age: 82
End: 2017-04-06

## 2017-04-06 VITALS
OXYGEN SATURATION: 98 % | HEART RATE: 87 BPM | SYSTOLIC BLOOD PRESSURE: 122 MMHG | TEMPERATURE: 97.6 F | DIASTOLIC BLOOD PRESSURE: 60 MMHG

## 2017-04-07 LAB
BASOPHILS # BLD AUTO: 0.02 K/UL
BASOPHILS NFR BLD AUTO: 0.3 %
EOSINOPHIL # BLD AUTO: 0.3 K/UL
EOSINOPHIL NFR BLD AUTO: 5.1 %
HBA1C MFR BLD HPLC: 6.7 %
HCT VFR BLD CALC: 38.7 %
HGB BLD-MCNC: 12.1 G/DL
IMM GRANULOCYTES NFR BLD AUTO: 0.2 %
LYMPHOCYTES # BLD AUTO: 1.58 K/UL
LYMPHOCYTES NFR BLD AUTO: 26.7 %
MAN DIFF?: NORMAL
MCHC RBC-ENTMCNC: 29.4 PG
MCHC RBC-ENTMCNC: 31.3 GM/DL
MCV RBC AUTO: 93.9 FL
MONOCYTES # BLD AUTO: 0.87 K/UL
MONOCYTES NFR BLD AUTO: 14.7 %
NEUTROPHILS # BLD AUTO: 3.13 K/UL
NEUTROPHILS NFR BLD AUTO: 53 %
PLATELET # BLD AUTO: 214 K/UL
RBC # BLD: 4.12 M/UL
RBC # FLD: 14.1 %
WBC # FLD AUTO: 5.91 K/UL

## 2017-04-10 LAB
ALBUMIN SERPL ELPH-MCNC: 4.2 G/DL
ALP BLD-CCNC: 88 U/L
ALT SERPL-CCNC: 12 U/L
ANION GAP SERPL CALC-SCNC: 18 MMOL/L
AST SERPL-CCNC: 15 U/L
BILIRUB SERPL-MCNC: 0.3 MG/DL
BUN SERPL-MCNC: 32 MG/DL
CALCIUM SERPL-MCNC: 10 MG/DL
CHLORIDE SERPL-SCNC: 95 MMOL/L
CHOLEST SERPL-MCNC: 137 MG/DL
CHOLEST/HDLC SERPL: 5.1 RATIO
CO2 SERPL-SCNC: 26 MMOL/L
CREAT SERPL-MCNC: 0.96 MG/DL
GLUCOSE SERPL-MCNC: 124 MG/DL
HDLC SERPL-MCNC: 27 MG/DL
LDLC SERPL CALC-MCNC: 83 MG/DL
POTASSIUM SERPL-SCNC: 4.3 MMOL/L
PROT SERPL-MCNC: 7.1 G/DL
SODIUM SERPL-SCNC: 139 MMOL/L
TRIGL SERPL-MCNC: 135 MG/DL
TSH SERPL-ACNC: <0.01 UIU/ML

## 2017-04-27 ENCOUNTER — RX RENEWAL (OUTPATIENT)
Age: 82
End: 2017-04-27

## 2017-05-18 ENCOUNTER — APPOINTMENT (OUTPATIENT)
Dept: HEART AND VASCULAR | Facility: CLINIC | Age: 82
End: 2017-05-18

## 2017-05-18 VITALS
SYSTOLIC BLOOD PRESSURE: 120 MMHG | OXYGEN SATURATION: 97 % | DIASTOLIC BLOOD PRESSURE: 58 MMHG | HEART RATE: 74 BPM | TEMPERATURE: 97.5 F | BODY MASS INDEX: 25.34 KG/M2 | HEIGHT: 63 IN | RESPIRATION RATE: 12 BRPM | WEIGHT: 143 LBS

## 2017-05-19 LAB
ALBUMIN SERPL ELPH-MCNC: 4.3 G/DL
ALP BLD-CCNC: 77 U/L
ALT SERPL-CCNC: 11 U/L
ANION GAP SERPL CALC-SCNC: 17 MMOL/L
AST SERPL-CCNC: 15 U/L
BASOPHILS # BLD AUTO: 0.02 K/UL
BASOPHILS NFR BLD AUTO: 0.4 %
BILIRUB SERPL-MCNC: 0.6 MG/DL
BUN SERPL-MCNC: 20 MG/DL
CALCIUM SERPL-MCNC: 9.9 MG/DL
CHLORIDE SERPL-SCNC: 100 MMOL/L
CHOLEST SERPL-MCNC: 167 MG/DL
CHOLEST/HDLC SERPL: 5.6 RATIO
CO2 SERPL-SCNC: 24 MMOL/L
CREAT SERPL-MCNC: 0.88 MG/DL
EOSINOPHIL # BLD AUTO: 0.31 K/UL
EOSINOPHIL NFR BLD AUTO: 6.5 %
GLUCOSE SERPL-MCNC: 139 MG/DL
HBA1C MFR BLD HPLC: 6.4 %
HCT VFR BLD CALC: 37.4 %
HDLC SERPL-MCNC: 30 MG/DL
HGB BLD-MCNC: 11.8 G/DL
IMM GRANULOCYTES NFR BLD AUTO: 0.2 %
LDLC SERPL CALC-MCNC: 110 MG/DL
LYMPHOCYTES # BLD AUTO: 1.17 K/UL
LYMPHOCYTES NFR BLD AUTO: 24.4 %
MAN DIFF?: NORMAL
MCHC RBC-ENTMCNC: 29.1 PG
MCHC RBC-ENTMCNC: 31.6 GM/DL
MCV RBC AUTO: 92.3 FL
MONOCYTES # BLD AUTO: 0.55 K/UL
MONOCYTES NFR BLD AUTO: 11.5 %
NEUTROPHILS # BLD AUTO: 2.73 K/UL
NEUTROPHILS NFR BLD AUTO: 57 %
PLATELET # BLD AUTO: 204 K/UL
POTASSIUM SERPL-SCNC: 4.3 MMOL/L
PROT SERPL-MCNC: 6.7 G/DL
RBC # BLD: 4.05 M/UL
RBC # FLD: 14.4 %
SODIUM SERPL-SCNC: 141 MMOL/L
T3 SERPL-MCNC: 124 NG/DL
T4 SERPL-MCNC: 8.3 UG/DL
TRIGL SERPL-MCNC: 134 MG/DL
TSH SERPL-ACNC: <0.01 UIU/ML
WBC # FLD AUTO: 4.79 K/UL

## 2017-07-27 ENCOUNTER — APPOINTMENT (OUTPATIENT)
Dept: HEART AND VASCULAR | Facility: CLINIC | Age: 82
End: 2017-07-27
Payer: MEDICARE

## 2017-07-27 VITALS
HEART RATE: 74 BPM | BODY MASS INDEX: 26.05 KG/M2 | SYSTOLIC BLOOD PRESSURE: 110 MMHG | HEIGHT: 63 IN | WEIGHT: 147 LBS | OXYGEN SATURATION: 98 % | DIASTOLIC BLOOD PRESSURE: 56 MMHG

## 2017-07-27 PROCEDURE — 36415 COLL VENOUS BLD VENIPUNCTURE: CPT

## 2017-07-27 PROCEDURE — 99214 OFFICE O/P EST MOD 30 MIN: CPT | Mod: 25

## 2017-07-31 ENCOUNTER — RX RENEWAL (OUTPATIENT)
Age: 82
End: 2017-07-31

## 2017-07-31 LAB
ALBUMIN SERPL ELPH-MCNC: 4.3 G/DL
ALP BLD-CCNC: 71 U/L
ALT SERPL-CCNC: 12 U/L
AST SERPL-CCNC: 16 U/L
BASOPHILS # BLD AUTO: 0.04 K/UL
BASOPHILS NFR BLD AUTO: 0.8 %
BILIRUB SERPL-MCNC: 0.5 MG/DL
BUN SERPL-MCNC: 19 MG/DL
CALCIUM SERPL-MCNC: 9.8 MG/DL
CHLORIDE SERPL-SCNC: 101 MMOL/L
CHOLEST SERPL-MCNC: 197 MG/DL
CHOLEST/HDLC SERPL: 5.8 RATIO
CO2 SERPL-SCNC: 26 MMOL/L
CREAT SERPL-MCNC: 0.99 MG/DL
EOSINOPHIL # BLD AUTO: 0.28 K/UL
EOSINOPHIL NFR BLD AUTO: 5.3 %
GLUCOSE SERPL-MCNC: 194 MG/DL
HBA1C MFR BLD HPLC: 6.2 %
HCT VFR BLD CALC: 40.7 %
HDLC SERPL-MCNC: 34 MG/DL
HGB BLD-MCNC: 12.8 G/DL
IMM GRANULOCYTES NFR BLD AUTO: 0 %
LDLC SERPL CALC-MCNC: 126 MG/DL
LYMPHOCYTES # BLD AUTO: 1.32 K/UL
LYMPHOCYTES NFR BLD AUTO: 24.8 %
MAN DIFF?: NORMAL
MCHC RBC-ENTMCNC: 29.2 PG
MCHC RBC-ENTMCNC: 31.4 GM/DL
MCV RBC AUTO: 92.9 FL
MONOCYTES # BLD AUTO: 0.45 K/UL
MONOCYTES NFR BLD AUTO: 8.4 %
NEUTROPHILS # BLD AUTO: 3.24 K/UL
NEUTROPHILS NFR BLD AUTO: 60.7 %
PLATELET # BLD AUTO: 180 K/UL
POTASSIUM SERPL-SCNC: 4.6 MMOL/L
PROT SERPL-MCNC: 6.9 G/DL
RBC # BLD: 4.38 M/UL
RBC # FLD: 14.8 %
SODIUM SERPL-SCNC: 142 MMOL/L
TRIGL SERPL-MCNC: 184 MG/DL
TSH SERPL-ACNC: 0.35 UIU/ML
WBC # FLD AUTO: 5.33 K/UL

## 2017-09-29 ENCOUNTER — APPOINTMENT (OUTPATIENT)
Dept: HEART AND VASCULAR | Facility: CLINIC | Age: 82
End: 2017-09-29
Payer: MEDICARE

## 2017-09-29 VITALS
SYSTOLIC BLOOD PRESSURE: 124 MMHG | RESPIRATION RATE: 12 BRPM | BODY MASS INDEX: 26.58 KG/M2 | HEIGHT: 63 IN | DIASTOLIC BLOOD PRESSURE: 50 MMHG | OXYGEN SATURATION: 99 % | HEART RATE: 71 BPM | WEIGHT: 150 LBS | TEMPERATURE: 97.5 F

## 2017-09-29 DIAGNOSIS — I65.23 OCCLUSION AND STENOSIS OF BILATERAL CAROTID ARTERIES: ICD-10-CM

## 2017-09-29 PROCEDURE — 93306 TTE W/DOPPLER COMPLETE: CPT | Mod: XE

## 2017-09-29 PROCEDURE — 93000 ELECTROCARDIOGRAM COMPLETE: CPT

## 2017-09-29 PROCEDURE — 99214 OFFICE O/P EST MOD 30 MIN: CPT | Mod: 25

## 2017-09-29 PROCEDURE — 93880 EXTRACRANIAL BILAT STUDY: CPT | Mod: XE

## 2017-09-29 RX ORDER — LINAGLIPTIN 5 MG/1
5 TABLET, FILM COATED ORAL DAILY
Qty: 90 | Refills: 0 | Status: DISCONTINUED | COMMUNITY
Start: 2017-01-26 | End: 2017-09-29

## 2017-10-02 LAB
ALBUMIN SERPL ELPH-MCNC: 4.1 G/DL
ALP BLD-CCNC: 72 U/L
ALT SERPL-CCNC: 13 U/L
ANION GAP SERPL CALC-SCNC: 15 MMOL/L
AST SERPL-CCNC: 19 U/L
BASOPHILS # BLD AUTO: 0.03 K/UL
BASOPHILS NFR BLD AUTO: 0.5 %
BILIRUB SERPL-MCNC: 0.3 MG/DL
BUN SERPL-MCNC: 20 MG/DL
CALCIUM SERPL-MCNC: 9.6 MG/DL
CHLORIDE SERPL-SCNC: 101 MMOL/L
CHOLEST SERPL-MCNC: 180 MG/DL
CHOLEST/HDLC SERPL: 5.3 RATIO
CO2 SERPL-SCNC: 25 MMOL/L
CREAT SERPL-MCNC: 1.03 MG/DL
EOSINOPHIL # BLD AUTO: 0.33 K/UL
EOSINOPHIL NFR BLD AUTO: 5.9 %
GLUCOSE SERPL-MCNC: 87 MG/DL
HBA1C MFR BLD HPLC: 6.3 %
HCT VFR BLD CALC: 39.8 %
HDLC SERPL-MCNC: 34 MG/DL
HGB BLD-MCNC: 12.7 G/DL
IMM GRANULOCYTES NFR BLD AUTO: 0 %
LDLC SERPL CALC-MCNC: 102 MG/DL
LYMPHOCYTES # BLD AUTO: 1.5 K/UL
LYMPHOCYTES NFR BLD AUTO: 26.9 %
MAN DIFF?: NORMAL
MCHC RBC-ENTMCNC: 30.2 PG
MCHC RBC-ENTMCNC: 31.9 GM/DL
MCV RBC AUTO: 94.8 FL
MONOCYTES # BLD AUTO: 0.67 K/UL
MONOCYTES NFR BLD AUTO: 12 %
NEUTROPHILS # BLD AUTO: 3.05 K/UL
NEUTROPHILS NFR BLD AUTO: 54.7 %
PLATELET # BLD AUTO: 156 K/UL
POTASSIUM SERPL-SCNC: 4.6 MMOL/L
PROT SERPL-MCNC: 6.9 G/DL
RBC # BLD: 4.2 M/UL
RBC # FLD: 14.1 %
SODIUM SERPL-SCNC: 141 MMOL/L
TRIGL SERPL-MCNC: 222 MG/DL
TSH SERPL-ACNC: 0.64 UIU/ML
WBC # FLD AUTO: 5.58 K/UL

## 2017-11-16 ENCOUNTER — APPOINTMENT (OUTPATIENT)
Dept: HEART AND VASCULAR | Facility: CLINIC | Age: 82
End: 2017-11-16
Payer: MEDICARE

## 2017-11-16 VITALS
OXYGEN SATURATION: 94 % | BODY MASS INDEX: 28.17 KG/M2 | RESPIRATION RATE: 12 BRPM | HEIGHT: 63 IN | WEIGHT: 159 LBS | HEART RATE: 87 BPM | DIASTOLIC BLOOD PRESSURE: 60 MMHG | SYSTOLIC BLOOD PRESSURE: 120 MMHG | TEMPERATURE: 97.4 F

## 2017-11-16 DIAGNOSIS — M16.11 UNILATERAL PRIMARY OSTEOARTHRITIS, RIGHT HIP: ICD-10-CM

## 2017-11-16 DIAGNOSIS — M25.551 PAIN IN RIGHT HIP: ICD-10-CM

## 2017-11-16 PROCEDURE — 99214 OFFICE O/P EST MOD 30 MIN: CPT | Mod: 25

## 2017-11-16 PROCEDURE — 36415 COLL VENOUS BLD VENIPUNCTURE: CPT

## 2017-11-17 LAB
ALBUMIN SERPL ELPH-MCNC: 4.3 G/DL
ALP BLD-CCNC: 65 U/L
ALT SERPL-CCNC: 14 U/L
ANION GAP SERPL CALC-SCNC: 16 MMOL/L
APPEARANCE: CLEAR
AST SERPL-CCNC: 15 U/L
BASOPHILS # BLD AUTO: 0.03 K/UL
BASOPHILS NFR BLD AUTO: 0.6 %
BILIRUB SERPL-MCNC: 0.4 MG/DL
BILIRUBIN URINE: NEGATIVE
BLOOD URINE: NEGATIVE
BUN SERPL-MCNC: 20 MG/DL
CALCIUM SERPL-MCNC: 8.1 MG/DL
CHLORIDE SERPL-SCNC: 105 MMOL/L
CHOLEST SERPL-MCNC: 170 MG/DL
CHOLEST/HDLC SERPL: 4.9 RATIO
CO2 SERPL-SCNC: 24 MMOL/L
COLOR: YELLOW
CREAT SERPL-MCNC: 0.91 MG/DL
CREAT SPEC-SCNC: 84 MG/DL
EOSINOPHIL # BLD AUTO: 0.35 K/UL
EOSINOPHIL NFR BLD AUTO: 7.5 %
GLUCOSE QUALITATIVE U: NEGATIVE MG/DL
GLUCOSE SERPL-MCNC: 178 MG/DL
HBA1C MFR BLD HPLC: 6.2 %
HCT VFR BLD CALC: 38.2 %
HDLC SERPL-MCNC: 35 MG/DL
HGB BLD-MCNC: 12.3 G/DL
IMM GRANULOCYTES NFR BLD AUTO: 0 %
KETONES URINE: NEGATIVE
LDLC SERPL CALC-MCNC: 103 MG/DL
LEUKOCYTE ESTERASE URINE: NEGATIVE
LYMPHOCYTES # BLD AUTO: 1.11 K/UL
LYMPHOCYTES NFR BLD AUTO: 23.9 %
MAN DIFF?: NORMAL
MCHC RBC-ENTMCNC: 30.8 PG
MCHC RBC-ENTMCNC: 32.2 GM/DL
MCV RBC AUTO: 95.5 FL
MICROALBUMIN 24H UR DL<=1MG/L-MCNC: <0.3 MG/DL
MICROALBUMIN/CREAT 24H UR-RTO: NORMAL
MONOCYTES # BLD AUTO: 0.43 K/UL
MONOCYTES NFR BLD AUTO: 9.2 %
NEUTROPHILS # BLD AUTO: 2.73 K/UL
NEUTROPHILS NFR BLD AUTO: 58.8 %
NITRITE URINE: NEGATIVE
PH URINE: 6.5
PLATELET # BLD AUTO: 149 K/UL
POTASSIUM SERPL-SCNC: 4.5 MMOL/L
PROT SERPL-MCNC: 6.8 G/DL
PROTEIN URINE: NEGATIVE MG/DL
RBC # BLD: 4 M/UL
RBC # FLD: 13.8 %
SODIUM SERPL-SCNC: 145 MMOL/L
SPECIFIC GRAVITY URINE: 1.02
TRIGL SERPL-MCNC: 162 MG/DL
TSH SERPL-ACNC: 0.58 UIU/ML
UROBILINOGEN URINE: NEGATIVE MG/DL
WBC # FLD AUTO: 4.65 K/UL

## 2018-01-04 ENCOUNTER — APPOINTMENT (OUTPATIENT)
Dept: HEART AND VASCULAR | Facility: CLINIC | Age: 83
End: 2018-01-04

## 2018-01-18 ENCOUNTER — LABORATORY RESULT (OUTPATIENT)
Age: 83
End: 2018-01-18

## 2018-01-18 ENCOUNTER — APPOINTMENT (OUTPATIENT)
Dept: HEART AND VASCULAR | Facility: CLINIC | Age: 83
End: 2018-01-18
Payer: MEDICARE

## 2018-01-18 VITALS
SYSTOLIC BLOOD PRESSURE: 120 MMHG | OXYGEN SATURATION: 94 % | WEIGHT: 162 LBS | DIASTOLIC BLOOD PRESSURE: 50 MMHG | HEIGHT: 63 IN | BODY MASS INDEX: 28.7 KG/M2 | TEMPERATURE: 97.3 F

## 2018-01-18 PROCEDURE — 99214 OFFICE O/P EST MOD 30 MIN: CPT | Mod: 25

## 2018-01-18 PROCEDURE — 36415 COLL VENOUS BLD VENIPUNCTURE: CPT

## 2018-01-19 LAB
ALBUMIN SERPL ELPH-MCNC: 4.1 G/DL
ALP BLD-CCNC: 75 U/L
ALT SERPL-CCNC: 13 U/L
ANION GAP SERPL CALC-SCNC: 14 MMOL/L
AST SERPL-CCNC: 14 U/L
BASOPHILS # BLD AUTO: 0.03 K/UL
BASOPHILS NFR BLD AUTO: 0.5 %
BILIRUB SERPL-MCNC: 0.3 MG/DL
BUN SERPL-MCNC: 24 MG/DL
CALCIUM SERPL-MCNC: 9.5 MG/DL
CHLORIDE SERPL-SCNC: 103 MMOL/L
CHOLEST SERPL-MCNC: 149 MG/DL
CHOLEST/HDLC SERPL: 4.3 RATIO
CO2 SERPL-SCNC: 26 MMOL/L
CREAT SERPL-MCNC: 0.95 MG/DL
EOSINOPHIL # BLD AUTO: 0.25 K/UL
EOSINOPHIL NFR BLD AUTO: 4.4 %
GLUCOSE SERPL-MCNC: 175 MG/DL
HBA1C MFR BLD HPLC: 6.4 %
HCT VFR BLD CALC: 39.1 %
HDLC SERPL-MCNC: 35 MG/DL
HGB BLD-MCNC: 12.1 G/DL
IMM GRANULOCYTES NFR BLD AUTO: 0.2 %
LDLC SERPL CALC-MCNC: 87 MG/DL
LYMPHOCYTES # BLD AUTO: 1.18 K/UL
LYMPHOCYTES NFR BLD AUTO: 21 %
MAN DIFF?: NORMAL
MCHC RBC-ENTMCNC: 30.8 PG
MCHC RBC-ENTMCNC: 30.9 GM/DL
MCV RBC AUTO: 99.5 FL
MONOCYTES # BLD AUTO: 0.69 K/UL
MONOCYTES NFR BLD AUTO: 12.3 %
NEUTROPHILS # BLD AUTO: 3.46 K/UL
NEUTROPHILS NFR BLD AUTO: 61.6 %
PLATELET # BLD AUTO: 154 K/UL
POTASSIUM SERPL-SCNC: 4.9 MMOL/L
PROT SERPL-MCNC: 6.7 G/DL
RBC # BLD: 3.93 M/UL
RBC # FLD: 13.6 %
SODIUM SERPL-SCNC: 143 MMOL/L
TRIGL SERPL-MCNC: 137 MG/DL
TSH SERPL-ACNC: 0.03 UIU/ML
WBC # FLD AUTO: 5.62 K/UL

## 2018-03-29 ENCOUNTER — LABORATORY RESULT (OUTPATIENT)
Age: 83
End: 2018-03-29

## 2018-03-29 ENCOUNTER — APPOINTMENT (OUTPATIENT)
Dept: HEART AND VASCULAR | Facility: CLINIC | Age: 83
End: 2018-03-29
Payer: MEDICARE

## 2018-03-29 VITALS
TEMPERATURE: 97.4 F | DIASTOLIC BLOOD PRESSURE: 60 MMHG | HEART RATE: 80 BPM | WEIGHT: 163 LBS | BODY MASS INDEX: 28.88 KG/M2 | OXYGEN SATURATION: 94 % | SYSTOLIC BLOOD PRESSURE: 120 MMHG | HEIGHT: 63 IN

## 2018-03-29 PROCEDURE — 36415 COLL VENOUS BLD VENIPUNCTURE: CPT

## 2018-03-29 PROCEDURE — 99214 OFFICE O/P EST MOD 30 MIN: CPT | Mod: 25

## 2018-03-30 LAB
ALBUMIN SERPL ELPH-MCNC: 4.5 G/DL
ALP BLD-CCNC: 72 U/L
ALT SERPL-CCNC: 20 U/L
ANION GAP SERPL CALC-SCNC: 15 MMOL/L
AST SERPL-CCNC: 21 U/L
BASOPHILS # BLD AUTO: 0.02 K/UL
BASOPHILS NFR BLD AUTO: 0.3 %
BILIRUB SERPL-MCNC: 0.6 MG/DL
BUN SERPL-MCNC: 20 MG/DL
CALCIUM SERPL-MCNC: 10.7 MG/DL
CHLORIDE SERPL-SCNC: 100 MMOL/L
CHOLEST SERPL-MCNC: 110 MG/DL
CHOLEST/HDLC SERPL: 3.2 RATIO
CO2 SERPL-SCNC: 28 MMOL/L
CREAT SERPL-MCNC: 1.08 MG/DL
EOSINOPHIL # BLD AUTO: 0.35 K/UL
EOSINOPHIL NFR BLD AUTO: 5.2 %
GLUCOSE SERPL-MCNC: 147 MG/DL
HBA1C MFR BLD HPLC: 7 %
HCT VFR BLD CALC: 40.2 %
HDLC SERPL-MCNC: 34 MG/DL
HGB BLD-MCNC: 12.6 G/DL
IMM GRANULOCYTES NFR BLD AUTO: 0.1 %
LDLC SERPL CALC-MCNC: 53 MG/DL
LYMPHOCYTES # BLD AUTO: 1.36 K/UL
LYMPHOCYTES NFR BLD AUTO: 20.4 %
MAN DIFF?: NORMAL
MCHC RBC-ENTMCNC: 29.9 PG
MCHC RBC-ENTMCNC: 31.3 GM/DL
MCV RBC AUTO: 95.5 FL
MONOCYTES # BLD AUTO: 0.68 K/UL
MONOCYTES NFR BLD AUTO: 10.2 %
NEUTROPHILS # BLD AUTO: 4.26 K/UL
NEUTROPHILS NFR BLD AUTO: 63.8 %
PLATELET # BLD AUTO: 167 K/UL
POTASSIUM SERPL-SCNC: 4.7 MMOL/L
PROT SERPL-MCNC: 7.1 G/DL
RBC # BLD: 4.21 M/UL
RBC # FLD: 14.1 %
SODIUM SERPL-SCNC: 143 MMOL/L
TRIGL SERPL-MCNC: 114 MG/DL
TSH SERPL-ACNC: 0.75 UIU/ML
WBC # FLD AUTO: 6.68 K/UL

## 2018-08-27 PROBLEM — J45.909 UNSPECIFIED ASTHMA, UNCOMPLICATED: Chronic | Status: ACTIVE | Noted: 2017-02-21

## 2018-08-27 PROBLEM — K46.9 UNSPECIFIED ABDOMINAL HERNIA WITHOUT OBSTRUCTION OR GANGRENE: Chronic | Status: ACTIVE | Noted: 2017-02-21

## 2018-08-27 PROBLEM — E11.9 TYPE 2 DIABETES MELLITUS WITHOUT COMPLICATIONS: Chronic | Status: ACTIVE | Noted: 2017-02-21

## 2018-08-27 PROBLEM — H26.9 UNSPECIFIED CATARACT: Chronic | Status: ACTIVE | Noted: 2017-02-21

## 2018-08-27 PROBLEM — I25.10 ATHEROSCLEROTIC HEART DISEASE OF NATIVE CORONARY ARTERY WITHOUT ANGINA PECTORIS: Chronic | Status: ACTIVE | Noted: 2017-02-21

## 2018-08-27 PROBLEM — E03.9 HYPOTHYROIDISM, UNSPECIFIED: Chronic | Status: ACTIVE | Noted: 2017-02-21

## 2018-08-27 PROBLEM — N40.0 BENIGN PROSTATIC HYPERPLASIA WITHOUT LOWER URINARY TRACT SYMPTOMS: Chronic | Status: ACTIVE | Noted: 2017-02-21

## 2018-09-13 ENCOUNTER — APPOINTMENT (OUTPATIENT)
Dept: HEART AND VASCULAR | Facility: CLINIC | Age: 83
End: 2018-09-13
Payer: MEDICARE

## 2018-10-04 ENCOUNTER — APPOINTMENT (OUTPATIENT)
Dept: HEART AND VASCULAR | Facility: CLINIC | Age: 83
End: 2018-10-04
Payer: MEDICARE

## 2018-10-04 VITALS
HEIGHT: 63 IN | WEIGHT: 163 LBS | BODY MASS INDEX: 28.88 KG/M2 | HEART RATE: 75 BPM | OXYGEN SATURATION: 97 % | SYSTOLIC BLOOD PRESSURE: 102 MMHG | TEMPERATURE: 98.4 F | DIASTOLIC BLOOD PRESSURE: 50 MMHG

## 2018-10-04 PROCEDURE — 99214 OFFICE O/P EST MOD 30 MIN: CPT

## 2018-10-04 PROCEDURE — 36415 COLL VENOUS BLD VENIPUNCTURE: CPT

## 2018-10-04 PROCEDURE — 93000 ELECTROCARDIOGRAM COMPLETE: CPT

## 2018-10-04 RX ORDER — HYDROCORTISONE 2.5% 25 MG/G
2.5 CREAM TOPICAL
Qty: 1 | Refills: 3 | Status: ACTIVE | COMMUNITY
Start: 2018-10-04 | End: 1900-01-01

## 2018-10-08 ENCOUNTER — RX RENEWAL (OUTPATIENT)
Age: 83
End: 2018-10-08

## 2018-10-08 LAB
ALBUMIN SERPL ELPH-MCNC: 4.2 G/DL
ALP BLD-CCNC: 62 U/L
ALT SERPL-CCNC: 16 U/L
ANION GAP SERPL CALC-SCNC: 13 MMOL/L
AST SERPL-CCNC: 20 U/L
BASOPHILS # BLD AUTO: 0.03 K/UL
BASOPHILS NFR BLD AUTO: 0.6 %
BILIRUB SERPL-MCNC: 0.5 MG/DL
BUN SERPL-MCNC: 23 MG/DL
CALCIUM SERPL-MCNC: 9.6 MG/DL
CHLORIDE SERPL-SCNC: 104 MMOL/L
CHOLEST SERPL-MCNC: 92 MG/DL
CHOLEST/HDLC SERPL: 3.3 RATIO
CO2 SERPL-SCNC: 26 MMOL/L
CREAT SERPL-MCNC: 0.99 MG/DL
EOSINOPHIL # BLD AUTO: 0.36 K/UL
EOSINOPHIL NFR BLD AUTO: 6.9 %
GLUCOSE SERPL-MCNC: 163 MG/DL
HBA1C MFR BLD HPLC: 7.1 %
HCT VFR BLD CALC: 36.8 %
HDLC SERPL-MCNC: 28 MG/DL
HGB BLD-MCNC: 11.2 G/DL
IMM GRANULOCYTES NFR BLD AUTO: 0.2 %
LDLC SERPL CALC-MCNC: 44 MG/DL
LYMPHOCYTES # BLD AUTO: 1.34 K/UL
LYMPHOCYTES NFR BLD AUTO: 25.9 %
MAN DIFF?: NORMAL
MCHC RBC-ENTMCNC: 28.8 PG
MCHC RBC-ENTMCNC: 30.4 GM/DL
MCV RBC AUTO: 94.6 FL
MONOCYTES # BLD AUTO: 0.67 K/UL
MONOCYTES NFR BLD AUTO: 12.9 %
NEUTROPHILS # BLD AUTO: 2.77 K/UL
NEUTROPHILS NFR BLD AUTO: 53.5 %
PLATELET # BLD AUTO: 148 K/UL
POTASSIUM SERPL-SCNC: 4.7 MMOL/L
PROT SERPL-MCNC: 6.6 G/DL
RBC # BLD: 3.89 M/UL
RBC # FLD: 14.2 %
SODIUM SERPL-SCNC: 143 MMOL/L
TRIGL SERPL-MCNC: 101 MG/DL
TSH SERPL-ACNC: 0.57 UIU/ML
WBC # FLD AUTO: 5.18 K/UL

## 2018-10-15 ENCOUNTER — RX RENEWAL (OUTPATIENT)
Age: 83
End: 2018-10-15

## 2018-12-06 ENCOUNTER — APPOINTMENT (OUTPATIENT)
Dept: HEART AND VASCULAR | Facility: CLINIC | Age: 83
End: 2018-12-06
Payer: MEDICARE

## 2018-12-06 VITALS
RESPIRATION RATE: 14 BRPM | OXYGEN SATURATION: 97 % | SYSTOLIC BLOOD PRESSURE: 121 MMHG | HEART RATE: 76 BPM | WEIGHT: 163 LBS | DIASTOLIC BLOOD PRESSURE: 68 MMHG | HEIGHT: 63 IN | TEMPERATURE: 97.7 F | BODY MASS INDEX: 28.88 KG/M2

## 2018-12-06 DIAGNOSIS — I35.0 NONRHEUMATIC AORTIC (VALVE) STENOSIS: ICD-10-CM

## 2018-12-06 PROCEDURE — 36415 COLL VENOUS BLD VENIPUNCTURE: CPT

## 2018-12-06 PROCEDURE — 99214 OFFICE O/P EST MOD 30 MIN: CPT

## 2018-12-06 RX ORDER — FAMOTIDINE 40 MG/1
40 TABLET, FILM COATED ORAL
Qty: 90 | Refills: 0 | Status: ACTIVE | OUTPATIENT
Start: 2018-12-06

## 2018-12-06 NOTE — HISTORY OF PRESENT ILLNESS
[FreeTextEntry1] : edema- improved but not resolved\par no cp or dyspnea\par inguinal pain resolved\par getting treated by vasc for recurrent LLE ulcer- has sue boot now\par no fever or chills\par no angina or syncope\par

## 2018-12-06 NOTE — DISCUSSION/SUMMARY
[___ Month(s)] : [unfilled] month(s) [With Me] : with me [FreeTextEntry1] : vasc treating venous stasis ulcer\par asympt AS, cad\par rfm discussed\par see me in a month prior to travel to [facility]

## 2018-12-06 NOTE — PHYSICAL EXAM
[General Appearance - Well Developed] : well developed [Normal Appearance] : normal appearance [Well Groomed] : well groomed [General Appearance - Well Nourished] : well nourished [No Deformities] : no deformities [General Appearance - In No Acute Distress] : no acute distress [Normal Conjunctiva] : the conjunctiva exhibited no abnormalities [Eyelids - No Xanthelasma] : the eyelids demonstrated no xanthelasmas [Normal Oral Mucosa] : normal oral mucosa [No Oral Pallor] : no oral pallor [No Oral Cyanosis] : no oral cyanosis [Normal Jugular Venous A Waves Present] : normal jugular venous A waves present [Normal Jugular Venous V Waves Present] : normal jugular venous V waves present [No Jugular Venous Corral A Waves] : no jugular venous corral A waves [Respiration, Rhythm And Depth] : normal respiratory rhythm and effort [Exaggerated Use Of Accessory Muscles For Inspiration] : no accessory muscle use [Auscultation Breath Sounds / Voice Sounds] : lungs were clear to auscultation bilaterally [Heart Rate And Rhythm] : heart rate and rhythm were normal [Heart Sounds] : normal S1 and S2 [Abdomen Soft] : soft [Abdomen Tenderness] : non-tender [Abdomen Mass (___ Cm)] : no abdominal mass palpated [Abnormal Walk] : normal gait [Gait - Sufficient For Exercise Testing] : the gait was sufficient for exercise testing [Nail Clubbing] : no clubbing of the fingernails [Cyanosis, Localized] : no localized cyanosis [Petechial Hemorrhages (___cm)] : no petechial hemorrhages [Skin Color & Pigmentation] : normal skin color and pigmentation [] : no rash [No Venous Stasis] : no venous stasis [Skin Lesions] : no skin lesions [No Skin Ulcers] : no skin ulcer [No Xanthoma] : no  xanthoma was observed [Oriented To Time, Place, And Person] : oriented to person, place, and time [Affect] : the affect was normal [Mood] : the mood was normal [No Anxiety] : not feeling anxious [FreeTextEntry1] : 2+ bilat edema

## 2018-12-07 LAB
ALBUMIN SERPL ELPH-MCNC: 4.5 G/DL
ALP BLD-CCNC: 72 U/L
ALT SERPL-CCNC: 17 U/L
ANION GAP SERPL CALC-SCNC: 14 MMOL/L
AST SERPL-CCNC: 17 U/L
BASOPHILS # BLD AUTO: 0.03 K/UL
BASOPHILS NFR BLD AUTO: 0.4 %
BILIRUB SERPL-MCNC: 0.6 MG/DL
BUN SERPL-MCNC: 21 MG/DL
CALCIUM SERPL-MCNC: 9.1 MG/DL
CHLORIDE SERPL-SCNC: 101 MMOL/L
CHOLEST SERPL-MCNC: 112 MG/DL
CHOLEST/HDLC SERPL: 3.5 RATIO
CO2 SERPL-SCNC: 25 MMOL/L
CREAT SERPL-MCNC: 0.81 MG/DL
EOSINOPHIL # BLD AUTO: 0.27 K/UL
EOSINOPHIL NFR BLD AUTO: 3.6 %
GLUCOSE SERPL-MCNC: 144 MG/DL
HBA1C MFR BLD HPLC: 7.2 %
HCT VFR BLD CALC: 38.2 %
HDLC SERPL-MCNC: 32 MG/DL
HGB BLD-MCNC: 12.2 G/DL
IMM GRANULOCYTES NFR BLD AUTO: 0.3 %
LDLC SERPL CALC-MCNC: 55 MG/DL
LYMPHOCYTES # BLD AUTO: 1.24 K/UL
LYMPHOCYTES NFR BLD AUTO: 16.4 %
MAN DIFF?: NORMAL
MCHC RBC-ENTMCNC: 29.5 PG
MCHC RBC-ENTMCNC: 31.9 GM/DL
MCV RBC AUTO: 92.5 FL
MONOCYTES # BLD AUTO: 0.97 K/UL
MONOCYTES NFR BLD AUTO: 12.8 %
NEUTROPHILS # BLD AUTO: 5.05 K/UL
NEUTROPHILS NFR BLD AUTO: 66.5 %
PLATELET # BLD AUTO: 167 K/UL
POTASSIUM SERPL-SCNC: 4.4 MMOL/L
PROT SERPL-MCNC: 7 G/DL
RBC # BLD: 4.13 M/UL
RBC # FLD: 14 %
SODIUM SERPL-SCNC: 140 MMOL/L
TRIGL SERPL-MCNC: 123 MG/DL
TSH SERPL-ACNC: 0.84 UIU/ML
WBC # FLD AUTO: 7.58 K/UL

## 2019-01-03 ENCOUNTER — APPOINTMENT (OUTPATIENT)
Dept: HEART AND VASCULAR | Facility: CLINIC | Age: 84
End: 2019-01-03
Payer: MEDICARE

## 2019-01-03 VITALS
SYSTOLIC BLOOD PRESSURE: 120 MMHG | HEIGHT: 63 IN | OXYGEN SATURATION: 96 % | WEIGHT: 163 LBS | TEMPERATURE: 97.7 F | DIASTOLIC BLOOD PRESSURE: 60 MMHG | HEART RATE: 68 BPM | BODY MASS INDEX: 28.88 KG/M2

## 2019-01-03 PROCEDURE — 99214 OFFICE O/P EST MOD 30 MIN: CPT

## 2019-01-03 NOTE — HISTORY OF PRESENT ILLNESS
[FreeTextEntry1] : edema- improved but not resolved\par no cp or dyspnea\par inguinal pain resolved\par small ulcer on LLE pre-tibial\par no fever or chills\par no angina or syncope\par

## 2019-01-03 NOTE — DISCUSSION/SUMMARY
[___ Month(s)] : [unfilled] month(s) [With Me] : with me [FreeTextEntry1] : will see vasc for the leg\par less edema\par needs to take lasix every day

## 2019-01-03 NOTE — PHYSICAL EXAM
[General Appearance - Well Developed] : well developed [Normal Appearance] : normal appearance [Well Groomed] : well groomed [General Appearance - Well Nourished] : well nourished [No Deformities] : no deformities [General Appearance - In No Acute Distress] : no acute distress [Normal Conjunctiva] : the conjunctiva exhibited no abnormalities [Eyelids - No Xanthelasma] : the eyelids demonstrated no xanthelasmas [Normal Oral Mucosa] : normal oral mucosa [No Oral Pallor] : no oral pallor [No Oral Cyanosis] : no oral cyanosis [Normal Jugular Venous A Waves Present] : normal jugular venous A waves present [Normal Jugular Venous V Waves Present] : normal jugular venous V waves present [No Jugular Venous Corral A Waves] : no jugular venous corral A waves [Respiration, Rhythm And Depth] : normal respiratory rhythm and effort [Exaggerated Use Of Accessory Muscles For Inspiration] : no accessory muscle use [Auscultation Breath Sounds / Voice Sounds] : lungs were clear to auscultation bilaterally [Heart Rate And Rhythm] : heart rate and rhythm were normal [Heart Sounds] : normal S1 and S2 [Abdomen Soft] : soft [Abdomen Tenderness] : non-tender [Abdomen Mass (___ Cm)] : no abdominal mass palpated [Abnormal Walk] : normal gait [Gait - Sufficient For Exercise Testing] : the gait was sufficient for exercise testing [Nail Clubbing] : no clubbing of the fingernails [Cyanosis, Localized] : no localized cyanosis [Petechial Hemorrhages (___cm)] : no petechial hemorrhages [Skin Color & Pigmentation] : normal skin color and pigmentation [] : no rash [No Venous Stasis] : no venous stasis [Skin Lesions] : no skin lesions [No Skin Ulcers] : no skin ulcer [No Xanthoma] : no  xanthoma was observed [Oriented To Time, Place, And Person] : oriented to person, place, and time [Affect] : the affect was normal [Mood] : the mood was normal [No Anxiety] : not feeling anxious [FreeTextEntry1] : 2cm superficial ulcer w granulation

## 2019-02-07 ENCOUNTER — APPOINTMENT (OUTPATIENT)
Dept: HEART AND VASCULAR | Facility: CLINIC | Age: 84
End: 2019-02-07
Payer: MEDICARE

## 2019-02-07 VITALS
TEMPERATURE: 97 F | SYSTOLIC BLOOD PRESSURE: 112 MMHG | OXYGEN SATURATION: 97 % | BODY MASS INDEX: 28.88 KG/M2 | HEIGHT: 63 IN | HEART RATE: 63 BPM | DIASTOLIC BLOOD PRESSURE: 60 MMHG | WEIGHT: 163 LBS

## 2019-02-07 PROCEDURE — 36415 COLL VENOUS BLD VENIPUNCTURE: CPT

## 2019-02-07 PROCEDURE — 99214 OFFICE O/P EST MOD 30 MIN: CPT

## 2019-02-07 NOTE — DISCUSSION/SUMMARY
[___ Month(s)] : [unfilled] month(s) [With Me] : with me [FreeTextEntry1] : edema worse\par needs to double diuretic\par check lytes now

## 2019-02-07 NOTE — HISTORY OF PRESENT ILLNESS
[FreeTextEntry1] : edema- improved but not resolved\par no cp or dyspnea\par inguinal pain resolved\par seeing vasc for the non-healing LE ulcer\par no fever or chills\par no angina or syncope\par

## 2019-02-08 LAB
ALBUMIN SERPL ELPH-MCNC: 4.4 G/DL
ALP BLD-CCNC: 72 U/L
ALT SERPL-CCNC: 17 U/L
ANION GAP SERPL CALC-SCNC: 15 MMOL/L
AST SERPL-CCNC: 17 U/L
BASOPHILS # BLD AUTO: 0.02 K/UL
BASOPHILS NFR BLD AUTO: 0.4 %
BILIRUB SERPL-MCNC: 0.5 MG/DL
BUN SERPL-MCNC: 25 MG/DL
CALCIUM SERPL-MCNC: 9.3 MG/DL
CHLORIDE SERPL-SCNC: 100 MMOL/L
CHOLEST SERPL-MCNC: 153 MG/DL
CHOLEST/HDLC SERPL: 5.3 RATIO
CO2 SERPL-SCNC: 25 MMOL/L
CREAT SERPL-MCNC: 1.08 MG/DL
EOSINOPHIL # BLD AUTO: 0.28 K/UL
EOSINOPHIL NFR BLD AUTO: 5 %
GLUCOSE SERPL-MCNC: 192 MG/DL
HBA1C MFR BLD HPLC: 7.4 %
HCT VFR BLD CALC: 36 %
HDLC SERPL-MCNC: 29 MG/DL
HGB BLD-MCNC: 11 G/DL
IMM GRANULOCYTES NFR BLD AUTO: 0.2 %
LDLC SERPL CALC-MCNC: 98 MG/DL
LYMPHOCYTES # BLD AUTO: 0.9 K/UL
LYMPHOCYTES NFR BLD AUTO: 16.1 %
MAN DIFF?: NORMAL
MCHC RBC-ENTMCNC: 28.9 PG
MCHC RBC-ENTMCNC: 30.6 GM/DL
MCV RBC AUTO: 94.7 FL
MONOCYTES # BLD AUTO: 0.76 K/UL
MONOCYTES NFR BLD AUTO: 13.6 %
NEUTROPHILS # BLD AUTO: 3.61 K/UL
NEUTROPHILS NFR BLD AUTO: 64.7 %
PLATELET # BLD AUTO: 167 K/UL
POTASSIUM SERPL-SCNC: 4.5 MMOL/L
PROT SERPL-MCNC: 6.6 G/DL
RBC # BLD: 3.8 M/UL
RBC # FLD: 14.2 %
SODIUM SERPL-SCNC: 140 MMOL/L
TRIGL SERPL-MCNC: 128 MG/DL
WBC # FLD AUTO: 5.58 K/UL

## 2019-02-11 LAB — TSH SERPL-ACNC: 0.74 UIU/ML

## 2019-03-28 ENCOUNTER — APPOINTMENT (OUTPATIENT)
Dept: HEART AND VASCULAR | Facility: CLINIC | Age: 84
End: 2019-03-28
Payer: MEDICARE

## 2019-03-28 ENCOUNTER — RX RENEWAL (OUTPATIENT)
Age: 84
End: 2019-03-28

## 2019-03-28 VITALS
BODY MASS INDEX: 27.99 KG/M2 | HEART RATE: 61 BPM | TEMPERATURE: 97.2 F | RESPIRATION RATE: 14 BRPM | SYSTOLIC BLOOD PRESSURE: 110 MMHG | WEIGHT: 158.03 LBS | DIASTOLIC BLOOD PRESSURE: 68 MMHG | OXYGEN SATURATION: 97 %

## 2019-03-28 DIAGNOSIS — L03.119 CELLULITIS OF UNSPECIFIED PART OF LIMB: ICD-10-CM

## 2019-03-28 PROCEDURE — 36415 COLL VENOUS BLD VENIPUNCTURE: CPT

## 2019-03-28 PROCEDURE — 99214 OFFICE O/P EST MOD 30 MIN: CPT

## 2019-03-28 RX ORDER — COLCHICINE 0.6 MG/1
0.6 TABLET, FILM COATED ORAL TWICE DAILY
Qty: 20 | Refills: 0 | Status: ACTIVE | COMMUNITY
Start: 2019-03-14 | End: 1900-01-01

## 2019-03-28 NOTE — DISCUSSION/SUMMARY
[___ Week(s)] : [unfilled] week(s) [With Me] : with me [FreeTextEntry1] : still needs to take bid diuretic\par seeing vasc next week\par check lytes\par elevate the legs

## 2019-03-28 NOTE — HISTORY OF PRESENT ILLNESS
[FreeTextEntry1] : edema- improved but not resolved\par no cp or dyspnea\par inguinal pain resolved\par seeing vasc for the non-healing LE ulcer on monday\par no fever or chills\par no angina or syncope\par

## 2019-03-29 LAB
ALBUMIN SERPL ELPH-MCNC: 4.4 G/DL
ALP BLD-CCNC: 79 U/L
ALT SERPL-CCNC: 30 U/L
ANION GAP SERPL CALC-SCNC: 14 MMOL/L
AST SERPL-CCNC: 25 U/L
BASOPHILS # BLD AUTO: 0.05 K/UL
BASOPHILS NFR BLD AUTO: 1 %
BILIRUB SERPL-MCNC: 0.5 MG/DL
BUN SERPL-MCNC: 22 MG/DL
CALCIUM SERPL-MCNC: 9.3 MG/DL
CHLORIDE SERPL-SCNC: 99 MMOL/L
CHOLEST SERPL-MCNC: 91 MG/DL
CHOLEST/HDLC SERPL: 3.4 RATIO
CO2 SERPL-SCNC: 27 MMOL/L
CREAT SERPL-MCNC: 0.97 MG/DL
EOSINOPHIL # BLD AUTO: 0.36 K/UL
EOSINOPHIL NFR BLD AUTO: 6.9 %
ESTIMATED AVERAGE GLUCOSE: 166 MG/DL
GLUCOSE SERPL-MCNC: 221 MG/DL
HBA1C MFR BLD HPLC: 7.4 %
HCT VFR BLD CALC: 37.6 %
HDLC SERPL-MCNC: 27 MG/DL
HGB BLD-MCNC: 11.7 G/DL
IMM GRANULOCYTES NFR BLD AUTO: 0.2 %
LDLC SERPL CALC-MCNC: 44 MG/DL
LYMPHOCYTES # BLD AUTO: 1 K/UL
LYMPHOCYTES NFR BLD AUTO: 19.1 %
MAN DIFF?: NORMAL
MCHC RBC-ENTMCNC: 29 PG
MCHC RBC-ENTMCNC: 31.1 GM/DL
MCV RBC AUTO: 93.3 FL
MONOCYTES # BLD AUTO: 0.6 K/UL
MONOCYTES NFR BLD AUTO: 11.5 %
NEUTROPHILS # BLD AUTO: 3.21 K/UL
NEUTROPHILS NFR BLD AUTO: 61.3 %
PLATELET # BLD AUTO: 167 K/UL
POTASSIUM SERPL-SCNC: 4 MMOL/L
PROT SERPL-MCNC: 7 G/DL
RBC # BLD: 4.03 M/UL
RBC # FLD: 13.6 %
SODIUM SERPL-SCNC: 140 MMOL/L
TRIGL SERPL-MCNC: 102 MG/DL
TSH SERPL-ACNC: 0.69 UIU/ML
WBC # FLD AUTO: 5.23 K/UL

## 2019-05-29 ENCOUNTER — CLINICAL ADVICE (OUTPATIENT)
Age: 84
End: 2019-05-29

## 2019-05-30 ENCOUNTER — TRANSCRIPTION ENCOUNTER (OUTPATIENT)
Age: 84
End: 2019-05-30

## 2019-05-30 ENCOUNTER — APPOINTMENT (OUTPATIENT)
Dept: HEART AND VASCULAR | Facility: CLINIC | Age: 84
End: 2019-05-30
Payer: MEDICARE

## 2019-05-30 VITALS
WEIGHT: 158 LBS | SYSTOLIC BLOOD PRESSURE: 112 MMHG | OXYGEN SATURATION: 95 % | HEART RATE: 60 BPM | RESPIRATION RATE: 14 BRPM | DIASTOLIC BLOOD PRESSURE: 72 MMHG | BODY MASS INDEX: 28 KG/M2 | HEIGHT: 63 IN | TEMPERATURE: 97.7 F

## 2019-05-30 PROCEDURE — 36415 COLL VENOUS BLD VENIPUNCTURE: CPT

## 2019-05-30 PROCEDURE — 99214 OFFICE O/P EST MOD 30 MIN: CPT

## 2019-05-31 LAB
ALBUMIN SERPL ELPH-MCNC: 4.5 G/DL
ALP BLD-CCNC: 86 U/L
ALT SERPL-CCNC: 10 U/L
ANION GAP SERPL CALC-SCNC: 17 MMOL/L
AST SERPL-CCNC: 17 U/L
BASOPHILS # BLD AUTO: 0.03 K/UL
BASOPHILS NFR BLD AUTO: 0.5 %
BILIRUB SERPL-MCNC: 0.5 MG/DL
BUN SERPL-MCNC: 22 MG/DL
CALCIUM SERPL-MCNC: 9.4 MG/DL
CHLORIDE SERPL-SCNC: 102 MMOL/L
CHOLEST SERPL-MCNC: 125 MG/DL
CHOLEST/HDLC SERPL: 5 RATIO
CO2 SERPL-SCNC: 23 MMOL/L
CREAT SERPL-MCNC: 1 MG/DL
EOSINOPHIL # BLD AUTO: 0.25 K/UL
EOSINOPHIL NFR BLD AUTO: 4.1 %
ESTIMATED AVERAGE GLUCOSE: 174 MG/DL
GLUCOSE SERPL-MCNC: 189 MG/DL
HBA1C MFR BLD HPLC: 7.7 %
HCT VFR BLD CALC: 36.9 %
HDLC SERPL-MCNC: 25 MG/DL
HGB BLD-MCNC: 11.3 G/DL
IMM GRANULOCYTES NFR BLD AUTO: 0.2 %
LDLC SERPL CALC-MCNC: 72 MG/DL
LYMPHOCYTES # BLD AUTO: 1.2 K/UL
LYMPHOCYTES NFR BLD AUTO: 19.9 %
MAN DIFF?: NORMAL
MCHC RBC-ENTMCNC: 28.8 PG
MCHC RBC-ENTMCNC: 30.6 GM/DL
MCV RBC AUTO: 94.1 FL
MONOCYTES # BLD AUTO: 0.82 K/UL
MONOCYTES NFR BLD AUTO: 13.6 %
NEUTROPHILS # BLD AUTO: 3.72 K/UL
NEUTROPHILS NFR BLD AUTO: 61.7 %
PLATELET # BLD AUTO: 169 K/UL
POTASSIUM SERPL-SCNC: 4.2 MMOL/L
PROT SERPL-MCNC: 6.8 G/DL
RBC # BLD: 3.92 M/UL
RBC # FLD: 14.6 %
SODIUM SERPL-SCNC: 142 MMOL/L
TRIGL SERPL-MCNC: 138 MG/DL
TSH SERPL-ACNC: 0.56 UIU/ML
WBC # FLD AUTO: 6.03 K/UL

## 2019-06-01 ENCOUNTER — TRANSCRIPTION ENCOUNTER (OUTPATIENT)
Age: 84
End: 2019-06-01

## 2019-06-27 ENCOUNTER — APPOINTMENT (OUTPATIENT)
Dept: INTERNAL MEDICINE | Facility: CLINIC | Age: 84
End: 2019-06-27
Payer: MEDICARE

## 2019-06-27 VITALS
HEIGHT: 63 IN | DIASTOLIC BLOOD PRESSURE: 60 MMHG | SYSTOLIC BLOOD PRESSURE: 108 MMHG | TEMPERATURE: 93.4 F | HEART RATE: 62 BPM | RESPIRATION RATE: 14 BRPM | OXYGEN SATURATION: 93 %

## 2019-06-27 DIAGNOSIS — R26.81 UNSTEADINESS ON FEET: ICD-10-CM

## 2019-06-27 PROCEDURE — 99214 OFFICE O/P EST MOD 30 MIN: CPT

## 2019-06-27 RX ORDER — AMOXICILLIN AND CLAVULANATE POTASSIUM 875; 125 MG/1; MG/1
875-125 TABLET, COATED ORAL
Qty: 28 | Refills: 0 | Status: ACTIVE | COMMUNITY
Start: 2019-06-27 | End: 1900-01-01

## 2019-07-10 NOTE — DISCUSSION/SUMMARY
[___ Month(s)] : [unfilled] month(s) [With Me] : with me [FreeTextEntry1] : restart augmentin\par referred ortho/hand\par deconditioned after being in hospt, needs reinitiation of PT\par needs orthotics as well

## 2019-07-10 NOTE — HISTORY OF PRESENT ILLNESS
[FreeTextEntry1] : abscess of finger- was in hospt for 5 days\par still painful and erythematous\par no discharge\par no fevere\par no cp or dyspnea\par no fever or chills\par finished abx 2 days ago\par \par

## 2019-07-10 NOTE — HISTORY OF PRESENT ILLNESS
[FreeTextEntry1] : abscess of finger\par 4 days- painful, erythema, non-draining at this point\par no cp or dyspnea\par no fever or chills\par no angina or syncope\par

## 2019-07-10 NOTE — DISCUSSION/SUMMARY
[FreeTextEntry1] : abscess finger- needs I/D\par will go to [facility] ER

## 2019-07-10 NOTE — PHYSICAL EXAM
[General Appearance - Well Developed] : well developed [Normal Appearance] : normal appearance [Well Groomed] : well groomed [General Appearance - Well Nourished] : well nourished [No Deformities] : no deformities [General Appearance - In No Acute Distress] : no acute distress [Normal Conjunctiva] : the conjunctiva exhibited no abnormalities [Eyelids - No Xanthelasma] : the eyelids demonstrated no xanthelasmas [Normal Oral Mucosa] : normal oral mucosa [No Oral Pallor] : no oral pallor [No Oral Cyanosis] : no oral cyanosis [Normal Jugular Venous A Waves Present] : normal jugular venous A waves present [Normal Jugular Venous V Waves Present] : normal jugular venous V waves present [No Jugular Venous Corral A Waves] : no jugular venous corral A waves [Respiration, Rhythm And Depth] : normal respiratory rhythm and effort [Exaggerated Use Of Accessory Muscles For Inspiration] : no accessory muscle use [Auscultation Breath Sounds / Voice Sounds] : lungs were clear to auscultation bilaterally [Heart Rate And Rhythm] : heart rate and rhythm were normal [Heart Sounds] : normal S1 and S2 [Abdomen Soft] : soft [Abdomen Tenderness] : non-tender [Abdomen Mass (___ Cm)] : no abdominal mass palpated [Gait - Sufficient For Exercise Testing] : the gait was sufficient for exercise testing [Nail Clubbing] : no clubbing of the fingernails [Cyanosis, Localized] : no localized cyanosis [Petechial Hemorrhages (___cm)] : no petechial hemorrhages [Skin Color & Pigmentation] : normal skin color and pigmentation [] : no rash [No Venous Stasis] : no venous stasis [Skin Lesions] : no skin lesions [No Skin Ulcers] : no skin ulcer [No Xanthoma] : no  xanthoma was observed [Oriented To Time, Place, And Person] : oriented to person, place, and time [Affect] : the affect was normal [Mood] : the mood was normal [No Anxiety] : not feeling anxious [FreeTextEntry1] : 2cm superficial ulcer w granulation

## 2019-07-10 NOTE — PHYSICAL EXAM
[General Appearance - Well Developed] : well developed [Normal Appearance] : normal appearance [Well Groomed] : well groomed [General Appearance - Well Nourished] : well nourished [No Deformities] : no deformities [General Appearance - In No Acute Distress] : no acute distress [Normal Conjunctiva] : the conjunctiva exhibited no abnormalities [Eyelids - No Xanthelasma] : the eyelids demonstrated no xanthelasmas [Normal Oral Mucosa] : normal oral mucosa [No Oral Cyanosis] : no oral cyanosis [No Oral Pallor] : no oral pallor [Normal Jugular Venous A Waves Present] : normal jugular venous A waves present [No Jugular Venous Corral A Waves] : no jugular venous corral A waves [Normal Jugular Venous V Waves Present] : normal jugular venous V waves present [Respiration, Rhythm And Depth] : normal respiratory rhythm and effort [Exaggerated Use Of Accessory Muscles For Inspiration] : no accessory muscle use [Auscultation Breath Sounds / Voice Sounds] : lungs were clear to auscultation bilaterally [Heart Rate And Rhythm] : heart rate and rhythm were normal [Heart Sounds] : normal S1 and S2 [Abdomen Tenderness] : non-tender [Abdomen Soft] : soft [Abdomen Mass (___ Cm)] : no abdominal mass palpated [Gait - Sufficient For Exercise Testing] : the gait was sufficient for exercise testing [Nail Clubbing] : no clubbing of the fingernails [Cyanosis, Localized] : no localized cyanosis [Petechial Hemorrhages (___cm)] : no petechial hemorrhages [Skin Color & Pigmentation] : normal skin color and pigmentation [No Venous Stasis] : no venous stasis [] : no rash [Skin Lesions] : no skin lesions [No Skin Ulcers] : no skin ulcer [Oriented To Time, Place, And Person] : oriented to person, place, and time [No Xanthoma] : no  xanthoma was observed [Affect] : the affect was normal [Mood] : the mood was normal [No Anxiety] : not feeling anxious [FreeTextEntry1] : 2cm superficial ulcer w granulation

## 2019-07-25 ENCOUNTER — APPOINTMENT (OUTPATIENT)
Dept: INTERNAL MEDICINE | Facility: CLINIC | Age: 84
End: 2019-07-25
Payer: MEDICARE

## 2019-07-25 VITALS
TEMPERATURE: 96 F | HEIGHT: 63 IN | OXYGEN SATURATION: 98 % | BODY MASS INDEX: 27.64 KG/M2 | SYSTOLIC BLOOD PRESSURE: 100 MMHG | DIASTOLIC BLOOD PRESSURE: 60 MMHG | WEIGHT: 156 LBS | RESPIRATION RATE: 14 BRPM | HEART RATE: 67 BPM

## 2019-07-25 DIAGNOSIS — L02.519 CUTANEOUS ABSCESS OF UNSPECIFIED HAND: ICD-10-CM

## 2019-07-25 DIAGNOSIS — I25.10 ATHEROSCLEROTIC HEART DISEASE OF NATIVE CORONARY ARTERY W/OUT ANGINA PECTORIS: ICD-10-CM

## 2019-07-25 PROCEDURE — 99213 OFFICE O/P EST LOW 20 MIN: CPT

## 2019-07-25 RX ORDER — ALBUTEROL SULFATE 90 UG/1
108 (90 BASE) AEROSOL, METERED RESPIRATORY (INHALATION)
Qty: 1 | Refills: 3 | Status: ACTIVE | COMMUNITY
Start: 2017-11-14 | End: 1900-01-01

## 2019-07-25 RX ORDER — HYDROCORTISONE 2.5% 25 MG/G
2.5 CREAM TOPICAL DAILY
Qty: 1 | Refills: 0 | Status: ACTIVE | COMMUNITY
Start: 2019-07-25 | End: 1900-01-01

## 2019-07-25 NOTE — DISCUSSION/SUMMARY
[___ Month(s)] : [unfilled] month(s) [With Me] : with me [FreeTextEntry3] : labs [FreeTextEntry1] : better clinically\par no angina

## 2019-08-15 ENCOUNTER — RX RENEWAL (OUTPATIENT)
Age: 84
End: 2019-08-15

## 2019-09-26 ENCOUNTER — APPOINTMENT (OUTPATIENT)
Dept: HEART AND VASCULAR | Facility: CLINIC | Age: 84
End: 2019-09-26
Payer: MEDICARE

## 2019-09-26 VITALS
HEART RATE: 66 BPM | DIASTOLIC BLOOD PRESSURE: 50 MMHG | RESPIRATION RATE: 14 BRPM | WEIGHT: 150 LBS | BODY MASS INDEX: 26.58 KG/M2 | TEMPERATURE: 98 F | OXYGEN SATURATION: 96 % | SYSTOLIC BLOOD PRESSURE: 100 MMHG | HEIGHT: 63 IN

## 2019-09-26 DIAGNOSIS — M54.5 LOW BACK PAIN: ICD-10-CM

## 2019-09-26 DIAGNOSIS — R60.9 EDEMA, UNSPECIFIED: ICD-10-CM

## 2019-09-26 PROCEDURE — 36415 COLL VENOUS BLD VENIPUNCTURE: CPT

## 2019-09-26 PROCEDURE — 99214 OFFICE O/P EST MOD 30 MIN: CPT

## 2019-09-27 LAB
ALBUMIN SERPL ELPH-MCNC: 4.1 G/DL
ALP BLD-CCNC: 81 U/L
ALT SERPL-CCNC: 11 U/L
ANION GAP SERPL CALC-SCNC: 11 MMOL/L
AST SERPL-CCNC: 17 U/L
BASOPHILS # BLD AUTO: 0.05 K/UL
BASOPHILS NFR BLD AUTO: 1 %
BILIRUB SERPL-MCNC: 0.5 MG/DL
BUN SERPL-MCNC: 23 MG/DL
CALCIUM SERPL-MCNC: 9.6 MG/DL
CHLORIDE SERPL-SCNC: 103 MMOL/L
CHOLEST SERPL-MCNC: 145 MG/DL
CHOLEST/HDLC SERPL: 5 RATIO
CO2 SERPL-SCNC: 26 MMOL/L
CREAT SERPL-MCNC: 0.83 MG/DL
EOSINOPHIL # BLD AUTO: 0.29 K/UL
EOSINOPHIL NFR BLD AUTO: 5.5 %
ESTIMATED AVERAGE GLUCOSE: 154 MG/DL
GLUCOSE SERPL-MCNC: 153 MG/DL
HBA1C MFR BLD HPLC: 7 %
HCT VFR BLD CALC: 38.3 %
HDLC SERPL-MCNC: 29 MG/DL
HGB BLD-MCNC: 11.7 G/DL
IMM GRANULOCYTES NFR BLD AUTO: 0.2 %
LDLC SERPL CALC-MCNC: 92 MG/DL
LYMPHOCYTES # BLD AUTO: 1.03 K/UL
LYMPHOCYTES NFR BLD AUTO: 19.7 %
MAN DIFF?: NORMAL
MCHC RBC-ENTMCNC: 29.8 PG
MCHC RBC-ENTMCNC: 30.5 GM/DL
MCV RBC AUTO: 97.5 FL
MONOCYTES # BLD AUTO: 0.74 K/UL
MONOCYTES NFR BLD AUTO: 14.1 %
NEUTROPHILS # BLD AUTO: 3.12 K/UL
NEUTROPHILS NFR BLD AUTO: 59.5 %
PLATELET # BLD AUTO: 143 K/UL
POTASSIUM SERPL-SCNC: 4.5 MMOL/L
PROT SERPL-MCNC: 6.4 G/DL
RBC # BLD: 3.93 M/UL
RBC # FLD: 14.7 %
SODIUM SERPL-SCNC: 140 MMOL/L
TRIGL SERPL-MCNC: 118 MG/DL
TSH SERPL-ACNC: 1.12 UIU/ML
WBC # FLD AUTO: 5.24 K/UL

## 2019-11-07 ENCOUNTER — APPOINTMENT (OUTPATIENT)
Dept: HEART AND VASCULAR | Facility: CLINIC | Age: 84
End: 2019-11-07

## 2019-12-10 NOTE — HISTORY OF PRESENT ILLNESS
[FreeTextEntry1] : abscess healed\par no cp or dyspnea\par no fever or chills\par legs still w considerable edema but no open lesions\par still having difficulty ambulating, leg weakness, arthralgia\par

## 2019-12-10 NOTE — PHYSICAL EXAM
[General Appearance - Well Developed] : well developed [Normal Appearance] : normal appearance [Well Groomed] : well groomed [General Appearance - Well Nourished] : well nourished [No Deformities] : no deformities [General Appearance - In No Acute Distress] : no acute distress [Eyelids - No Xanthelasma] : the eyelids demonstrated no xanthelasmas [Normal Conjunctiva] : the conjunctiva exhibited no abnormalities [Normal Oral Mucosa] : normal oral mucosa [No Oral Pallor] : no oral pallor [No Oral Cyanosis] : no oral cyanosis [Normal Jugular Venous V Waves Present] : normal jugular venous V waves present [Normal Jugular Venous A Waves Present] : normal jugular venous A waves present [No Jugular Venous Corral A Waves] : no jugular venous corral A waves [Respiration, Rhythm And Depth] : normal respiratory rhythm and effort [Exaggerated Use Of Accessory Muscles For Inspiration] : no accessory muscle use [Auscultation Breath Sounds / Voice Sounds] : lungs were clear to auscultation bilaterally [Heart Rate And Rhythm] : heart rate and rhythm were normal [Heart Sounds] : normal S1 and S2 [Abdomen Soft] : soft [Abdomen Tenderness] : non-tender [Abdomen Mass (___ Cm)] : no abdominal mass palpated [Gait - Sufficient For Exercise Testing] : the gait was sufficient for exercise testing [Cyanosis, Localized] : no localized cyanosis [Nail Clubbing] : no clubbing of the fingernails [Petechial Hemorrhages (___cm)] : no petechial hemorrhages [] : no rash [Skin Color & Pigmentation] : normal skin color and pigmentation [Skin Lesions] : no skin lesions [No Venous Stasis] : no venous stasis [No Skin Ulcers] : no skin ulcer [No Xanthoma] : no  xanthoma was observed [Oriented To Time, Place, And Person] : oriented to person, place, and time [Mood] : the mood was normal [Affect] : the affect was normal [No Anxiety] : not feeling anxious [FreeTextEntry1] : 2cm superficial ulcer w granulation

## 2019-12-10 NOTE — DISCUSSION/SUMMARY
[___ Week(s)] : [unfilled] week(s) [With Me] : with me [FreeTextEntry1] : diuretic, elevation and low sodium and compression socks\par check labs\par fluvax by md\par would likely benefit from additional PT

## 2019-12-20 RX ORDER — ENEMA 19; 7 G/133ML; G/133ML
7-19 ENEMA RECTAL
Qty: 1 | Refills: 0 | Status: ACTIVE | COMMUNITY
Start: 2019-12-20 | End: 1900-01-01

## 2019-12-24 ENCOUNTER — EMERGENCY (EMERGENCY)
Facility: HOSPITAL | Age: 84
LOS: 1 days | Discharge: HOME | End: 2019-12-24
Attending: EMERGENCY MEDICINE | Admitting: EMERGENCY MEDICINE
Payer: MEDICARE

## 2019-12-24 VITALS
OXYGEN SATURATION: 98 % | TEMPERATURE: 98 F | HEART RATE: 61 BPM | SYSTOLIC BLOOD PRESSURE: 134 MMHG | RESPIRATION RATE: 18 BRPM | DIASTOLIC BLOOD PRESSURE: 61 MMHG

## 2019-12-24 VITALS
RESPIRATION RATE: 16 BRPM | TEMPERATURE: 97 F | DIASTOLIC BLOOD PRESSURE: 58 MMHG | HEART RATE: 87 BPM | SYSTOLIC BLOOD PRESSURE: 153 MMHG | OXYGEN SATURATION: 96 % | WEIGHT: 149.91 LBS | HEIGHT: 68 IN

## 2019-12-24 DIAGNOSIS — K59.00 CONSTIPATION, UNSPECIFIED: ICD-10-CM

## 2019-12-24 DIAGNOSIS — Z98.890 OTHER SPECIFIED POSTPROCEDURAL STATES: Chronic | ICD-10-CM

## 2019-12-24 DIAGNOSIS — H26.40 UNSPECIFIED SECONDARY CATARACT: Chronic | ICD-10-CM

## 2019-12-24 DIAGNOSIS — R11.0 NAUSEA: ICD-10-CM

## 2019-12-24 DIAGNOSIS — Z95.1 PRESENCE OF AORTOCORONARY BYPASS GRAFT: Chronic | ICD-10-CM

## 2019-12-24 LAB
ALBUMIN SERPL ELPH-MCNC: 3.9 G/DL — SIGNIFICANT CHANGE UP (ref 3.3–5)
ALP SERPL-CCNC: 133 U/L — HIGH (ref 40–120)
ALT FLD-CCNC: 18 U/L — SIGNIFICANT CHANGE UP (ref 10–45)
ANION GAP SERPL CALC-SCNC: 15 MMOL/L — SIGNIFICANT CHANGE UP (ref 5–17)
AST SERPL-CCNC: 32 U/L — SIGNIFICANT CHANGE UP (ref 10–40)
BASOPHILS # BLD AUTO: 0.06 K/UL — SIGNIFICANT CHANGE UP (ref 0–0.2)
BASOPHILS NFR BLD AUTO: 1 % — SIGNIFICANT CHANGE UP (ref 0–2)
BILIRUB SERPL-MCNC: 1.2 MG/DL — SIGNIFICANT CHANGE UP (ref 0.2–1.2)
BUN SERPL-MCNC: 20 MG/DL — SIGNIFICANT CHANGE UP (ref 7–23)
CALCIUM SERPL-MCNC: 9.8 MG/DL — SIGNIFICANT CHANGE UP (ref 8.4–10.5)
CHLORIDE SERPL-SCNC: 98 MMOL/L — SIGNIFICANT CHANGE UP (ref 96–108)
CO2 SERPL-SCNC: 25 MMOL/L — SIGNIFICANT CHANGE UP (ref 22–31)
CREAT SERPL-MCNC: 0.85 MG/DL — SIGNIFICANT CHANGE UP (ref 0.5–1.3)
EOSINOPHIL # BLD AUTO: 0.26 K/UL — SIGNIFICANT CHANGE UP (ref 0–0.5)
EOSINOPHIL NFR BLD AUTO: 4.4 % — SIGNIFICANT CHANGE UP (ref 0–6)
GLUCOSE SERPL-MCNC: 149 MG/DL — HIGH (ref 70–99)
HCT VFR BLD CALC: 48.2 % — SIGNIFICANT CHANGE UP (ref 39–50)
HGB BLD-MCNC: 15 G/DL — SIGNIFICANT CHANGE UP (ref 13–17)
IMM GRANULOCYTES NFR BLD AUTO: 0.2 % — SIGNIFICANT CHANGE UP (ref 0–1.5)
LACTATE SERPL-SCNC: 1.5 MMOL/L — SIGNIFICANT CHANGE UP (ref 0.5–2)
LIDOCAIN IGE QN: 8 U/L — SIGNIFICANT CHANGE UP (ref 7–60)
LYMPHOCYTES # BLD AUTO: 0.85 K/UL — LOW (ref 1–3.3)
LYMPHOCYTES # BLD AUTO: 14.4 % — SIGNIFICANT CHANGE UP (ref 13–44)
MAGNESIUM SERPL-MCNC: 2 MG/DL — SIGNIFICANT CHANGE UP (ref 1.6–2.6)
MCHC RBC-ENTMCNC: 28.3 PG — SIGNIFICANT CHANGE UP (ref 27–34)
MCHC RBC-ENTMCNC: 31.1 GM/DL — LOW (ref 32–36)
MCV RBC AUTO: 90.9 FL — SIGNIFICANT CHANGE UP (ref 80–100)
MONOCYTES # BLD AUTO: 0.61 K/UL — SIGNIFICANT CHANGE UP (ref 0–0.9)
MONOCYTES NFR BLD AUTO: 10.3 % — SIGNIFICANT CHANGE UP (ref 2–14)
NEUTROPHILS # BLD AUTO: 4.13 K/UL — SIGNIFICANT CHANGE UP (ref 1.8–7.4)
NEUTROPHILS NFR BLD AUTO: 69.7 % — SIGNIFICANT CHANGE UP (ref 43–77)
NRBC # BLD: 0 /100 WBCS — SIGNIFICANT CHANGE UP (ref 0–0)
PLATELET # BLD AUTO: 257 K/UL — SIGNIFICANT CHANGE UP (ref 150–400)
POTASSIUM SERPL-MCNC: 4.6 MMOL/L — SIGNIFICANT CHANGE UP (ref 3.5–5.3)
POTASSIUM SERPL-SCNC: 4.6 MMOL/L — SIGNIFICANT CHANGE UP (ref 3.5–5.3)
PROT SERPL-MCNC: 8.2 G/DL — SIGNIFICANT CHANGE UP (ref 6–8.3)
RBC # BLD: 5.3 M/UL — SIGNIFICANT CHANGE UP (ref 4.2–5.8)
RBC # FLD: 13.2 % — SIGNIFICANT CHANGE UP (ref 10.3–14.5)
SODIUM SERPL-SCNC: 138 MMOL/L — SIGNIFICANT CHANGE UP (ref 135–145)
WBC # BLD: 5.92 K/UL — SIGNIFICANT CHANGE UP (ref 3.8–10.5)
WBC # FLD AUTO: 5.92 K/UL — SIGNIFICANT CHANGE UP (ref 3.8–10.5)

## 2019-12-24 PROCEDURE — 83690 ASSAY OF LIPASE: CPT

## 2019-12-24 PROCEDURE — 85025 COMPLETE CBC W/AUTO DIFF WBC: CPT

## 2019-12-24 PROCEDURE — 80053 COMPREHEN METABOLIC PANEL: CPT

## 2019-12-24 PROCEDURE — 99284 EMERGENCY DEPT VISIT MOD MDM: CPT | Mod: 25

## 2019-12-24 PROCEDURE — 96365 THER/PROPH/DIAG IV INF INIT: CPT

## 2019-12-24 PROCEDURE — 83605 ASSAY OF LACTIC ACID: CPT

## 2019-12-24 PROCEDURE — 74177 CT ABD & PELVIS W/CONTRAST: CPT

## 2019-12-24 PROCEDURE — 96375 TX/PRO/DX INJ NEW DRUG ADDON: CPT

## 2019-12-24 PROCEDURE — 36415 COLL VENOUS BLD VENIPUNCTURE: CPT

## 2019-12-24 PROCEDURE — 96361 HYDRATE IV INFUSION ADD-ON: CPT

## 2019-12-24 PROCEDURE — 83735 ASSAY OF MAGNESIUM: CPT

## 2019-12-24 PROCEDURE — 99284 EMERGENCY DEPT VISIT MOD MDM: CPT

## 2019-12-24 PROCEDURE — 74177 CT ABD & PELVIS W/CONTRAST: CPT | Mod: 26

## 2019-12-24 RX ORDER — ONDANSETRON 8 MG/1
4 TABLET, FILM COATED ORAL ONCE
Refills: 0 | Status: COMPLETED | OUTPATIENT
Start: 2019-12-24 | End: 2019-12-24

## 2019-12-24 RX ORDER — CEPHALEXIN 500 MG
1 CAPSULE ORAL
Qty: 14 | Refills: 0
Start: 2019-12-24 | End: 2019-12-30

## 2019-12-24 RX ORDER — SODIUM CHLORIDE 9 MG/ML
1000 INJECTION INTRAMUSCULAR; INTRAVENOUS; SUBCUTANEOUS ONCE
Refills: 0 | Status: COMPLETED | OUTPATIENT
Start: 2019-12-24 | End: 2019-12-24

## 2019-12-24 RX ORDER — FLUOCINONIDE/EMOLLIENT BASE 0.05 %
1 CREAM (GRAM) TOPICAL
Qty: 0 | Refills: 0 | DISCHARGE

## 2019-12-24 RX ORDER — IOHEXOL 300 MG/ML
30 INJECTION, SOLUTION INTRAVENOUS ONCE
Refills: 0 | Status: COMPLETED | OUTPATIENT
Start: 2019-12-24 | End: 2019-12-24

## 2019-12-24 RX ORDER — MULTIVIT WITH MIN/MFOLATE/K2 340-15/3 G
1 POWDER (GRAM) ORAL ONCE
Refills: 0 | Status: COMPLETED | OUTPATIENT
Start: 2019-12-24 | End: 2019-12-24

## 2019-12-24 RX ORDER — ALBUTEROL 90 UG/1
1 AEROSOL, METERED ORAL
Qty: 0 | Refills: 0 | DISCHARGE

## 2019-12-24 RX ORDER — MORPHINE SULFATE 50 MG/1
2 CAPSULE, EXTENDED RELEASE ORAL ONCE
Refills: 0 | Status: DISCONTINUED | OUTPATIENT
Start: 2019-12-24 | End: 2019-12-24

## 2019-12-24 RX ORDER — POLYETHYLENE GLYCOL 3350 17 G/17G
17 POWDER, FOR SOLUTION ORAL
Qty: 1 | Refills: 0
Start: 2019-12-24

## 2019-12-24 RX ORDER — CEFAZOLIN SODIUM 1 G
1000 VIAL (EA) INJECTION ONCE
Refills: 0 | Status: COMPLETED | OUTPATIENT
Start: 2019-12-24 | End: 2019-12-24

## 2019-12-24 RX ADMIN — Medication 100 MILLIGRAM(S): at 18:14

## 2019-12-24 RX ADMIN — ONDANSETRON 4 MILLIGRAM(S): 8 TABLET, FILM COATED ORAL at 15:17

## 2019-12-24 RX ADMIN — SODIUM CHLORIDE 1000 MILLILITER(S): 9 INJECTION INTRAMUSCULAR; INTRAVENOUS; SUBCUTANEOUS at 19:10

## 2019-12-24 RX ADMIN — Medication 1000 MILLIGRAM(S): at 19:00

## 2019-12-24 RX ADMIN — SODIUM CHLORIDE 1000 MILLILITER(S): 9 INJECTION INTRAMUSCULAR; INTRAVENOUS; SUBCUTANEOUS at 16:10

## 2019-12-24 RX ADMIN — SODIUM CHLORIDE 2000 MILLILITER(S): 9 INJECTION INTRAMUSCULAR; INTRAVENOUS; SUBCUTANEOUS at 15:16

## 2019-12-24 RX ADMIN — Medication 1 BOTTLE: at 18:13

## 2019-12-24 RX ADMIN — IOHEXOL 30 MILLILITER(S): 300 INJECTION, SOLUTION INTRAVENOUS at 15:17

## 2019-12-24 NOTE — ED ADULT NURSE NOTE - CHPI ED NUR SYMPTOMS NEG
no nausea/no diarrhea/no blood in stool/no burning urination/no dysuria/no fever/no vomiting/no chills/no hematuria

## 2019-12-24 NOTE — ED PROVIDER NOTE - OBJECTIVE STATEMENT
84y old M with a history of CAD s/p CABG (echo in 2017 40%), DM, hypothyroidism and multiple hernia (x4) presents today with complaint of diffuse abdominal pain, back pain, constipation x 8 days, acutely worsening x 2-3 days. unable to get out of bed as too weak, developed beginning of pressure ulcer to sacrum as per wife x 2 days. denies urinary symptoms. +poor appetite and nausea denies vomiting. no fevers/chills. denies hx of sbo in the past. tried laxatives and enema without relief.

## 2019-12-24 NOTE — ED PROVIDER NOTE - PHYSICAL EXAMINATION
CONSTITUTIONAL: Well-appearing; well-nourished; in no apparent distress.   HEAD: Normocephalic; atraumatic.   EYES:  conjunctiva and sclera clear  ENT: normal nose; no rhinorrhea; normal pharynx with no erythema or lesions.   NECK: Supple; non-tender;   CARDIOVASCULAR: Normal S1, S2; no murmurs, rubs, or gallops. Regular rate and rhythm.   RESPIRATORY: Breathing easily; breath sounds clear and equal bilaterally; no wheezes, rhonchi, or rales.  GI: Soft; mild diffuse tenderness to palpation, reducible ventral hernia. No CVA tenderness. Sacrum w/ mild erythema. No rectal impaction. Sensation intact.  EXT: No cyanosis or edema; N/V intact  SKIN: Normal for age and race; warm; dry; good turgor; no apparent lesions or rash.   NEURO: A & O x 3; face symmetric; grossly unremarkable.   PSYCHOLOGICAL: The patient’s mood and manner are appropriate.

## 2019-12-24 NOTE — ED PROVIDER NOTE - NS ED ROS FT
ROS STATEMENT: all other ROS negative except as per HPI normal... Well appearing, well nourished, awake, alert, oriented to person, place, time/situation and in no apparent distress.

## 2019-12-24 NOTE — ED PROVIDER NOTE - PATIENT PORTAL LINK FT
You can access the FollowMyHealth Patient Portal offered by [facility] by registering at the following website: [email or web address]. By joining [facility] [employer]’s FollowMyHealth portal, you will also be able to view your health information using other applications (apps) compatible with our system.

## 2019-12-24 NOTE — ED ADULT NURSE NOTE - NSIMPLEMENTINTERV_GEN_ALL_ED
Implemented All Fall with Harm Risk Interventions:  [place] to call system. Call bell, personal items and telephone within reach. Instruct patient to call for assistance. Room bathroom lighting operational. Non-slip footwear when patient is off stretcher. Physically safe environment: no spills, clutter or unnecessary equipment. Stretcher in lowest position, wheels locked, appropriate side rails in place. Provide visual cue, wrist band, yellow gown, etc. Monitor gait and stability. Monitor for mental status changes and reorient to person, place, and time. Review medications for side effects contributing to fall risk. Reinforce activity limits and safety measures with patient and family. Provide visual clues: red socks.

## 2019-12-24 NOTE — ED PROVIDER NOTE - NSFOLLOWUPINSTRUCTIONS_ED_ALL_ED_FT
Constipation    Constipation is when a person has fewer than three bowel movements a week, has difficulty having a bowel movement, or has stools that are dry, hard, or larger than normal. Other symptoms can include abdominal pain or bloating. As people grow older, constipation is more common. A low-fiber diet, not taking in enough fluids, and taking certain medicines, including opioid painkillers, may make constipation worse. Treatment varies but may include dietary modifications (more fiber-rich foods), lifestyle modifications, and possible medications.     SEEK IMMEDIATE MEDICAL CARE IF YOU HAVE ANY OF THE FOLLOWING SYMPTOMS: bright red blood in your stool, constipation for longer than 4 days, abdominal or rectal pain, unexplained weight loss, or inability to pass gas.    Cellulitis    Cellulitis is a skin infection caused by bacteria. This condition occurs most often in the arms and lower legs but can occur anywhere over the body. Symptoms include redness, swelling, warm skin, tenderness, and chills/fever. If you were prescribed an antibiotic medicine, take it as told by your health care provider. Do not stop taking the antibiotic even if you start to feel better.    SEEK IMMEDIATE MEDICAL CARE IF YOU HAVE ANY OF THE FOLLOWING SYMPTOMS: worsening fever, red streaks coming from affected area, vomiting or diarrhea, or dizziness/lightheadedness.

## 2019-12-24 NOTE — ED PROVIDER NOTE - CLINICAL SUMMARY MEDICAL DECISION MAKING FREE TEXT BOX
here w/ abdominal pain and back pain w/ no bm, intermittently passing gas. given hx of hernia repairs concern for SBO - will plan for ct scan. will hydrate and give pain/nausea meds while awaiting workup

## 2020-01-09 ENCOUNTER — APPOINTMENT (OUTPATIENT)
Dept: HEART AND VASCULAR | Facility: CLINIC | Age: 85
End: 2020-01-09

## 2020-08-16 NOTE — PATIENT PROFILE ADULT. - NS SC CAGE ALCOHOL CUT DOWN
[] : No [No] : In the past 12 months have you used drugs other than those required for medical reasons? No [HIV test declined] : HIV test declined [None] : None [Student] : student [Single] : single [Employed] : employed no

## 2020-10-26 NOTE — ED ADULT TRIAGE NOTE - CHIEF COMPLAINT QUOTE
Patient c/o BLE weakness since yesterday. Denies focal deficits or confusion. Poor po intake. Reports loose cough as well as RLQ abdominal pain. Noted to have temp of 100.6 on arrival. Residential stability/Engagement in treatment

## 2022-06-10 NOTE — PATIENT PROFILE ADULT. - TEACHING/LEARNING FACTORS INFLUENCE READINESS TO LEARN
----- Message from Gary Ryan sent at 6/10/2022  3:40 PM CDT -----  Regarding: call back  Type:  Patient Returning Call    Who Called:patient   Who Left Message for Patient:n/a  Does the patient know what this is regarding?:call back   Would the patient rather a call back or a response via [facility]? Call back   Best Call Back Number:[phone]  Additional Information: n/a        
none

## 2023-04-18 NOTE — REASON FOR VISIT
Impression: PVD, OU. Status: Symptomatic. Plan: RDW. [Follow-Up - Clinic] : a clinic follow-up of [Coronary Artery Disease] : coronary artery disease

## 2024-12-18 NOTE — ED ADULT NURSE NOTE - PAIN RATING/NUMBER SCALE (0-10): REST
3
Partially impaired: cannot see medication labels or newsprint, but can see obstacles in path, and the surrounding layout; can count fingers at arm's length
